# Patient Record
Sex: FEMALE | Race: WHITE | Employment: OTHER | URBAN - METROPOLITAN AREA
[De-identification: names, ages, dates, MRNs, and addresses within clinical notes are randomized per-mention and may not be internally consistent; named-entity substitution may affect disease eponyms.]

---

## 2019-12-16 ENCOUNTER — HOSPITAL ENCOUNTER (OUTPATIENT)
Dept: PREADMISSION TESTING | Age: 69
Discharge: HOME | End: 2019-12-16
Payer: MEDICARE

## 2019-12-16 LAB
ANION GAP SERPL CALC-SCNC: 4 MMOL/L (ref 3–18)
BASOPHILS # BLD: 0 K/UL (ref 0–0.1)
BASOPHILS NFR BLD: 0 % (ref 0–2)
BUN SERPL-MCNC: 32 MG/DL (ref 7–18)
BUN/CREAT SERPL: 30 (ref 12–20)
CALCIUM SERPL-MCNC: 8.6 MG/DL (ref 8.5–10.1)
CHLORIDE SERPL-SCNC: 111 MMOL/L (ref 100–111)
CO2 SERPL-SCNC: 27 MMOL/L (ref 21–32)
CREAT SERPL-MCNC: 1.05 MG/DL (ref 0.6–1.3)
DIFFERENTIAL METHOD BLD: NORMAL
EOSINOPHIL # BLD: 0.2 K/UL (ref 0–0.4)
EOSINOPHIL NFR BLD: 3 % (ref 0–5)
ERYTHROCYTE [DISTWIDTH] IN BLOOD BY AUTOMATED COUNT: 12.7 % (ref 11.6–14.5)
GLUCOSE SERPL-MCNC: 118 MG/DL (ref 74–99)
HCT VFR BLD AUTO: 43.2 % (ref 35–45)
HGB BLD-MCNC: 13.7 G/DL (ref 12–16)
LYMPHOCYTES # BLD: 1.8 K/UL (ref 0.9–3.6)
LYMPHOCYTES NFR BLD: 23 % (ref 21–52)
MCH RBC QN AUTO: 30.6 PG (ref 24–34)
MCHC RBC AUTO-ENTMCNC: 31.7 G/DL (ref 31–37)
MCV RBC AUTO: 96.6 FL (ref 74–97)
MONOCYTES # BLD: 0.6 K/UL (ref 0.05–1.2)
MONOCYTES NFR BLD: 8 % (ref 3–10)
NEUTS SEG # BLD: 5 K/UL (ref 1.8–8)
NEUTS SEG NFR BLD: 66 % (ref 40–73)
PLATELET # BLD AUTO: 254 K/UL (ref 135–420)
PMV BLD AUTO: 10.5 FL (ref 9.2–11.8)
POTASSIUM SERPL-SCNC: 4.8 MMOL/L (ref 3.5–5.5)
RBC # BLD AUTO: 4.47 M/UL (ref 4.2–5.3)
SODIUM SERPL-SCNC: 142 MMOL/L (ref 136–145)
WBC # BLD AUTO: 7.6 K/UL (ref 4.6–13.2)

## 2019-12-16 PROCEDURE — 85025 COMPLETE CBC W/AUTO DIFF WBC: CPT

## 2019-12-16 PROCEDURE — 80048 BASIC METABOLIC PNL TOTAL CA: CPT

## 2019-12-16 PROCEDURE — 36415 COLL VENOUS BLD VENIPUNCTURE: CPT

## 2019-12-30 PROBLEM — N32.89 BLADDER MASS: Status: ACTIVE | Noted: 2019-12-30

## 2019-12-30 RX ORDER — MITOMYCIN 20 MG/40ML
40 SYRINGE (ML) INTRAVESICAL ONCE
Status: COMPLETED | OUTPATIENT
Start: 2019-12-31 | End: 2019-12-31

## 2019-12-31 ENCOUNTER — ANESTHESIA (OUTPATIENT)
Dept: SURGERY | Age: 69
End: 2019-12-31
Payer: MEDICARE

## 2019-12-31 ENCOUNTER — ANESTHESIA EVENT (OUTPATIENT)
Dept: SURGERY | Age: 69
End: 2019-12-31
Payer: MEDICARE

## 2019-12-31 ENCOUNTER — HOSPITAL ENCOUNTER (OUTPATIENT)
Age: 69
Setting detail: OUTPATIENT SURGERY
Discharge: HOME | End: 2019-12-31
Attending: UROLOGY | Admitting: UROLOGY
Payer: MEDICARE

## 2019-12-31 VITALS
WEIGHT: 293 LBS | OXYGEN SATURATION: 96 % | TEMPERATURE: 98.5 F | RESPIRATION RATE: 16 BRPM | SYSTOLIC BLOOD PRESSURE: 141 MMHG | DIASTOLIC BLOOD PRESSURE: 57 MMHG | HEIGHT: 63 IN | BODY MASS INDEX: 51.91 KG/M2 | HEART RATE: 78 BPM

## 2019-12-31 DIAGNOSIS — N32.89 BLADDER MASS: Primary | ICD-10-CM

## 2019-12-31 PROCEDURE — 77030028158 HC ELECTRD BISOLSR PROST RWOL -B: Performed by: UROLOGY

## 2019-12-31 PROCEDURE — 88307 TISSUE EXAM BY PATHOLOGIST: CPT

## 2019-12-31 PROCEDURE — C1769 GUIDE WIRE: HCPCS | Performed by: UROLOGY

## 2019-12-31 PROCEDURE — 74011250636 HC RX REV CODE- 250/636: Performed by: UROLOGY

## 2019-12-31 PROCEDURE — 77030012508 HC MSK AIRWY LMA AMBU -A: Performed by: ANESTHESIOLOGY

## 2019-12-31 PROCEDURE — 76210000026 HC REC RM PH II 1 TO 1.5 HR: Performed by: UROLOGY

## 2019-12-31 PROCEDURE — 76210000006 HC OR PH I REC 0.5 TO 1 HR: Performed by: UROLOGY

## 2019-12-31 PROCEDURE — 74011250636 HC RX REV CODE- 250/636: Performed by: ANESTHESIOLOGY

## 2019-12-31 PROCEDURE — 77030018842 HC SOL IRR SOD CL 9% BAXT -A: Performed by: UROLOGY

## 2019-12-31 PROCEDURE — 77030020782 HC GWN BAIR PAWS FLX 3M -B: Performed by: UROLOGY

## 2019-12-31 PROCEDURE — 77030040361 HC SLV COMPR DVT MDII -B: Performed by: UROLOGY

## 2019-12-31 PROCEDURE — 74011000250 HC RX REV CODE- 250: Performed by: NURSE ANESTHETIST, CERTIFIED REGISTERED

## 2019-12-31 PROCEDURE — 77030034696 HC CATH URETH FOL 2W BARD -A: Performed by: UROLOGY

## 2019-12-31 PROCEDURE — 74011250636 HC RX REV CODE- 250/636: Performed by: NURSE ANESTHETIST, CERTIFIED REGISTERED

## 2019-12-31 PROCEDURE — 76010000149 HC OR TIME 1 TO 1.5 HR: Performed by: UROLOGY

## 2019-12-31 PROCEDURE — 76060000033 HC ANESTHESIA 1 TO 1.5 HR: Performed by: UROLOGY

## 2019-12-31 RX ORDER — LIDOCAINE HYDROCHLORIDE 20 MG/ML
INJECTION, SOLUTION EPIDURAL; INFILTRATION; INTRACAUDAL; PERINEURAL AS NEEDED
Status: DISCONTINUED | OUTPATIENT
Start: 2019-12-31 | End: 2019-12-31 | Stop reason: HOSPADM

## 2019-12-31 RX ORDER — LEVOFLOXACIN 500 MG/1
500 TABLET, FILM COATED ORAL DAILY
Qty: 3 TAB | Refills: 0 | Status: SHIPPED | OUTPATIENT
Start: 2020-01-01 | End: 2020-01-04

## 2019-12-31 RX ORDER — SODIUM CHLORIDE 0.9 % (FLUSH) 0.9 %
5-40 SYRINGE (ML) INJECTION EVERY 8 HOURS
Status: DISCONTINUED | OUTPATIENT
Start: 2019-12-31 | End: 2019-12-31 | Stop reason: HOSPADM

## 2019-12-31 RX ORDER — LEVOFLOXACIN 500 MG/1
500 TABLET, FILM COATED ORAL DAILY
Qty: 3 TAB | Refills: 0 | Status: SHIPPED | OUTPATIENT
Start: 2020-01-01 | End: 2019-12-31 | Stop reason: SDUPTHER

## 2019-12-31 RX ORDER — DEXTROSE MONOHYDRATE 100 MG/ML
125-250 INJECTION, SOLUTION INTRAVENOUS AS NEEDED
Status: DISCONTINUED | OUTPATIENT
Start: 2019-12-31 | End: 2019-12-31 | Stop reason: HOSPADM

## 2019-12-31 RX ORDER — SODIUM CHLORIDE, SODIUM LACTATE, POTASSIUM CHLORIDE, CALCIUM CHLORIDE 600; 310; 30; 20 MG/100ML; MG/100ML; MG/100ML; MG/100ML
1000 INJECTION, SOLUTION INTRAVENOUS CONTINUOUS
Status: DISCONTINUED | OUTPATIENT
Start: 2019-12-31 | End: 2019-12-31 | Stop reason: HOSPADM

## 2019-12-31 RX ORDER — FENTANYL CITRATE 50 UG/ML
INJECTION, SOLUTION INTRAMUSCULAR; INTRAVENOUS AS NEEDED
Status: DISCONTINUED | OUTPATIENT
Start: 2019-12-31 | End: 2019-12-31 | Stop reason: HOSPADM

## 2019-12-31 RX ORDER — LEVOFLOXACIN 5 MG/ML
500 INJECTION, SOLUTION INTRAVENOUS ONCE
Status: COMPLETED | OUTPATIENT
Start: 2019-12-31 | End: 2019-12-31

## 2019-12-31 RX ORDER — SODIUM CHLORIDE 0.9 % (FLUSH) 0.9 %
5-40 SYRINGE (ML) INJECTION AS NEEDED
Status: DISCONTINUED | OUTPATIENT
Start: 2019-12-31 | End: 2019-12-31 | Stop reason: HOSPADM

## 2019-12-31 RX ORDER — TRAMADOL HYDROCHLORIDE 50 MG/1
50 TABLET ORAL
Qty: 25 TAB | Refills: 0 | Status: SHIPPED | OUTPATIENT
Start: 2019-12-31 | End: 2020-01-03

## 2019-12-31 RX ORDER — FLUMAZENIL 0.1 MG/ML
0.2 INJECTION INTRAVENOUS
Status: DISCONTINUED | OUTPATIENT
Start: 2019-12-31 | End: 2019-12-31 | Stop reason: HOSPADM

## 2019-12-31 RX ORDER — MIDAZOLAM HYDROCHLORIDE 1 MG/ML
INJECTION, SOLUTION INTRAMUSCULAR; INTRAVENOUS AS NEEDED
Status: DISCONTINUED | OUTPATIENT
Start: 2019-12-31 | End: 2019-12-31 | Stop reason: HOSPADM

## 2019-12-31 RX ORDER — SODIUM CHLORIDE, SODIUM LACTATE, POTASSIUM CHLORIDE, CALCIUM CHLORIDE 600; 310; 30; 20 MG/100ML; MG/100ML; MG/100ML; MG/100ML
125 INJECTION, SOLUTION INTRAVENOUS CONTINUOUS
Status: DISCONTINUED | OUTPATIENT
Start: 2019-12-31 | End: 2019-12-31 | Stop reason: HOSPADM

## 2019-12-31 RX ORDER — PROPOFOL 10 MG/ML
INJECTION, EMULSION INTRAVENOUS AS NEEDED
Status: DISCONTINUED | OUTPATIENT
Start: 2019-12-31 | End: 2019-12-31 | Stop reason: HOSPADM

## 2019-12-31 RX ORDER — NALOXONE HYDROCHLORIDE 0.4 MG/ML
0.1 INJECTION, SOLUTION INTRAMUSCULAR; INTRAVENOUS; SUBCUTANEOUS AS NEEDED
Status: DISCONTINUED | OUTPATIENT
Start: 2019-12-31 | End: 2019-12-31 | Stop reason: HOSPADM

## 2019-12-31 RX ORDER — ONDANSETRON 2 MG/ML
INJECTION INTRAMUSCULAR; INTRAVENOUS AS NEEDED
Status: DISCONTINUED | OUTPATIENT
Start: 2019-12-31 | End: 2019-12-31 | Stop reason: HOSPADM

## 2019-12-31 RX ORDER — DEXAMETHASONE SODIUM PHOSPHATE 4 MG/ML
INJECTION, SOLUTION INTRA-ARTICULAR; INTRALESIONAL; INTRAMUSCULAR; INTRAVENOUS; SOFT TISSUE AS NEEDED
Status: DISCONTINUED | OUTPATIENT
Start: 2019-12-31 | End: 2019-12-31 | Stop reason: HOSPADM

## 2019-12-31 RX ORDER — HYDROMORPHONE HYDROCHLORIDE 1 MG/ML
0.5 INJECTION, SOLUTION INTRAMUSCULAR; INTRAVENOUS; SUBCUTANEOUS
Status: DISCONTINUED | OUTPATIENT
Start: 2019-12-31 | End: 2019-12-31 | Stop reason: HOSPADM

## 2019-12-31 RX ORDER — FENTANYL CITRATE 50 UG/ML
25 INJECTION, SOLUTION INTRAMUSCULAR; INTRAVENOUS AS NEEDED
Status: DISCONTINUED | OUTPATIENT
Start: 2019-12-31 | End: 2019-12-31 | Stop reason: HOSPADM

## 2019-12-31 RX ORDER — EPHEDRINE SULFATE/0.9% NACL/PF 50 MG/5 ML
SYRINGE (ML) INTRAVENOUS AS NEEDED
Status: DISCONTINUED | OUTPATIENT
Start: 2019-12-31 | End: 2019-12-31 | Stop reason: HOSPADM

## 2019-12-31 RX ORDER — MAGNESIUM SULFATE 100 %
4 CRYSTALS MISCELLANEOUS AS NEEDED
Status: DISCONTINUED | OUTPATIENT
Start: 2019-12-31 | End: 2019-12-31 | Stop reason: HOSPADM

## 2019-12-31 RX ADMIN — FENTANYL CITRATE 25 MCG: 50 INJECTION, SOLUTION INTRAMUSCULAR; INTRAVENOUS at 08:46

## 2019-12-31 RX ADMIN — SODIUM CHLORIDE, SODIUM LACTATE, POTASSIUM CHLORIDE, AND CALCIUM CHLORIDE 125 ML/HR: 600; 310; 30; 20 INJECTION, SOLUTION INTRAVENOUS at 06:37

## 2019-12-31 RX ADMIN — FENTANYL CITRATE 25 MCG: 50 INJECTION, SOLUTION INTRAMUSCULAR; INTRAVENOUS at 09:37

## 2019-12-31 RX ADMIN — Medication 10 MG: at 07:57

## 2019-12-31 RX ADMIN — FENTANYL CITRATE 25 MCG: 50 INJECTION, SOLUTION INTRAMUSCULAR; INTRAVENOUS at 08:40

## 2019-12-31 RX ADMIN — Medication 40 MG: at 08:45

## 2019-12-31 RX ADMIN — SODIUM CHLORIDE, SODIUM LACTATE, POTASSIUM CHLORIDE, AND CALCIUM CHLORIDE 1000 ML: 600; 310; 30; 20 INJECTION, SOLUTION INTRAVENOUS at 10:15

## 2019-12-31 RX ADMIN — FENTANYL CITRATE 25 MCG: 50 INJECTION, SOLUTION INTRAMUSCULAR; INTRAVENOUS at 09:17

## 2019-12-31 RX ADMIN — FENTANYL CITRATE 25 MCG: 50 INJECTION, SOLUTION INTRAMUSCULAR; INTRAVENOUS at 07:50

## 2019-12-31 RX ADMIN — DEXAMETHASONE SODIUM PHOSPHATE 4 MG: 4 INJECTION, SOLUTION INTRAMUSCULAR; INTRAVENOUS at 07:45

## 2019-12-31 RX ADMIN — FENTANYL CITRATE 25 MCG: 50 INJECTION, SOLUTION INTRAMUSCULAR; INTRAVENOUS at 09:27

## 2019-12-31 RX ADMIN — PROPOFOL 170 MG: 10 INJECTION, EMULSION INTRAVENOUS at 07:37

## 2019-12-31 RX ADMIN — FENTANYL CITRATE 25 MCG: 50 INJECTION, SOLUTION INTRAMUSCULAR; INTRAVENOUS at 07:37

## 2019-12-31 RX ADMIN — ONDANSETRON HYDROCHLORIDE 4 MG: 2 INJECTION INTRAMUSCULAR; INTRAVENOUS at 07:45

## 2019-12-31 RX ADMIN — MIDAZOLAM 2 MG: 1 INJECTION INTRAMUSCULAR; INTRAVENOUS at 07:29

## 2019-12-31 RX ADMIN — LIDOCAINE HYDROCHLORIDE 60 MG: 20 INJECTION, SOLUTION EPIDURAL; INFILTRATION; INTRACAUDAL; PERINEURAL at 07:37

## 2019-12-31 RX ADMIN — Medication 10 MG: at 08:29

## 2019-12-31 RX ADMIN — LEVOFLOXACIN 500 MG: 5 INJECTION, SOLUTION INTRAVENOUS at 07:29

## 2019-12-31 RX ADMIN — SODIUM CHLORIDE, SODIUM LACTATE, POTASSIUM CHLORIDE, AND CALCIUM CHLORIDE: 600; 310; 30; 20 INJECTION, SOLUTION INTRAVENOUS at 08:20

## 2019-12-31 NOTE — ANESTHESIA POSTPROCEDURE EVALUATION
Procedure(s):  CYSTOSCOPY, TRANSURETHRAL RESECTION OF BLADDER TUMOR-  LARGE WITH MITOMYCIN. No value filed. Anesthesia Post Evaluation        Comments: Post-Anesthesia Evaluation and Assessment    Cardiovascular Function/Vital Signs  /49   Pulse 80   Temp 36.9 °C (98.5 °F)   Resp 19   Ht 5' 3\" (1.6 m)   Wt 134.9 kg (297 lb 5 oz)   SpO2 97%   BMI 52.67 kg/m²     Patient is status post Procedure(s):  CYSTOSCOPY, TRANSURETHRAL RESECTION OF BLADDER TUMOR-  LARGE WITH MITOMYCIN. Nausea/Vomiting: Controlled. Postoperative hydration reviewed and adequate. Pain:  Pain Scale 1: Numeric (0 - 10) (12/31/19 0927)  Pain Intensity 1: 5 (12/31/19 0927)   Managed. Neurological Status:   Neuro (WDL): Exceptions to WDL (12/31/19 0852)   At baseline. Mental Status and Level of Consciousness: Arousable. Pulmonary Status:   O2 Device: Room air (12/31/19 0929)   Adequate oxygenation and airway patent. Complications related to anesthesia: None    Post-anesthesia assessment completed. No concerns. Patient has met all discharge requirements. Signed By: Deidra Peng MD    December 31, 2019                   Vitals Value Taken Time   /49 12/31/2019  9:30 AM   Temp 36.9 °C (98.5 °F) 12/31/2019  8:56 AM   Pulse 75 12/31/2019  9:34 AM   Resp 17 12/31/2019  9:34 AM   SpO2 96 % 12/31/2019  9:34 AM   Vitals shown include unvalidated device data.

## 2019-12-31 NOTE — PERIOP NOTES
Reviewed discharge instructions with patient and her , including home chemo precautions, they verbalized understanding. Clarified with Dr Miguel Wilcox to stop taking Plavix till this Friday when he will give further instructions regarding.  ID band removed and placed in shredder at time of discharge

## 2019-12-31 NOTE — H&P
Urology [address] [place] [address]  Tel: [phone]  Fax: [phone]      Patient: Patrick Hanna  YOB: 1950          This 81year old  patient was referred by Rupa Buck. This 81year old female presents for Urinary Retention, Kidney and/or Ureteral Stone and Hematuria. History of Present Illness:  1. Urinary Retention   Severity level is severe. It occurs constantly. The problem is improving. Associated symptoms include hematuria (gross), incomplete emptying, pressure and slow stream. Pertinent negatives include chills, constipation and fever. Additional information: She went into retention after not really having any problems previously. She is did okay with the bain. her  catheter is out and she is voiding but not as good as baseline. MHL=650nr (12/6/19)         AAR=245 (12/13/19)    She is elie to void though. 2.  Kidney and/or Ureteral Stone   Onset was 2 weeks ago. Severity level is no pain. It occurs constantly. The problem is with no change. Location of pain is right flank. The pain does not radiate. Associated symptoms include hematuria (gross). Pertinent negatives include chills, constipation, diarrhea, fever, nausea and vomiting. 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     12/13/19 - No flank pain or radiation or aggravating issues lately. .      3.  Hematuria   The patient presents with hematuria (gross). The problem began 2 weeks ago. The symptoms are intermittent. The problem has improved. She denies pain. Pertinent history includes being at least 38years of age but not history of UTIs and family history of stones or prior prostate surgeries. She also complains of incomplete emptying, pressure and slow stream. She denies chills, fever, nausea and vomiting.  Additional information: She had gross hematuria just prior to going into retention. Cytology = pending     CT = stone. PAST MEDICAL/SURGICAL HISTORY   (Reviewed, updated)    Disease/disorder Onset Date Management Date Comments     Right foot plantar fascitis surgery     Total Hysterectomy         Hysterectomy     Appendectomy       Atrial Flutter              Cancer, uterine       Dyspnea       Endoscopy         Appendectomy 2010    Hypertension       Obesity       Stroke             PROBLEM LIST:   Problem List reviewed. Problem Description Onset Date Chronic Clinical Status Notes   Hypertension  Y     TIA  Y     Hyperlipidemia  Y           Medications (active prior to today)  Medication Name Sig Description Start Date Stop Date Refilled Rx Elsewhere   lisinopril 20 mg tablet take 1 tablet by oral route 2 times every day //   Y   Plavix 75 mg tablet take 1 tablet by oral route  every day //   Y   simvastatin 40 mg tablet take 1 tablet by oral route  every day in the evening //   Y     Medication Reconciliation  Medications reconciled today. Allergies  Ingredient Reaction (Severity) Medication Name Comment   PENICILLINS        Reviewed, no changes. Family History  (Reviewed, updated)  Relationship Family Member Name  Age at Death Condition Onset Age Cause of Death       No family history of Emphysema  N   Family h/o    Cancer - uterine     Father  Y  Coronary artery disease  N   Maternal grandfather    Cancer - lung           Social History:  (Reviewed, updated)  Tobacco use reviewed. Preferred language is [place]. MARITAL STATUS/FAMILY/SOCIAL SUPPORT  Marital status:    Tobacco use status: Current non-smoker. Smoking status: Never smoker. TOBACCO SCREENING:  Patient has never used tobacco. Patient has not used tobacco in the last 30 days. Patient has not used smokeless tobacco in the last 30 days.     SMOKING STATUS  Type Smoking Status Usage Per Day Years Used Pack Years Total Pack Years    Never smoker         ALCOHOL  There is a history of alcohol use. consumed rarely. Review of Systems  System Neg/Pos Details   Constitutional Negative Chills, Fever and Pain. GI Negative Constipation, Diarrhea, Nausea and Vomiting.  Positive Hematuria, Incomplete emptying, Pressure, Slow stream.     Vital Signs   Height  Time ft in cm Last Measured Height Position   12:36 PM 5.0 3.00 160.02 12/13/2019 Standing   Weight/BSA/BMI  Time lb oz kg Context BMI kg/m2 BSA m2   12:36 .00  128.820 dressed with shoes 50.31    Measured By  Time Measured by   12:36 PM Ca Glover     Physical Exam  Exam Findings Details   Constitutional Normal Well developed. Neck Exam Normal Inspection - Normal.   Respiratory Normal Inspection - Normal.   Abdomen Normal No abdominal tenderness. Genitourinary Normal Urethral meatus - Normal. External genitalia - Normal. Glands - Normal. Perineum - Normal. Anus - Normal. Vagina - Normal. No Suprapubic tenderness. No CVA tenderness. Extremity Normal No Edema. Psychiatric Normal Orientation - Oriented to time, place, person & situation. Appropriate mood and affect. Procedures:      Cystoscopy indication:  Other. Patient consent:  Consent was obtained. The procedure and risks were explained in detail. The patient was prepped and draped in the usual sterile fashion. Procedure:  A diagnostic cystourethroscopy was performed using a 16 Bulgarian flexible cystoscope  Patient position:  Dorsal lithotomy. Patient response:  Patient tolerated procedure well. Patient was given instructions. Patient was discharged in stable condition. Findings:  Anterior urethra normal in appearance. The bladder has lesion/mass found on the bladder. The first lesion/mass is 3cm is located dome of the bladder with characteristics of papillary. The second lesion/mass is 5cm is located left side of the bladder with characteristics of friable.  Ureteral orifices normal in appearance. Antibiotics:  Antibiotics given:  Impression:  Gross hematuria R31.0. Uroflow:  Feeling of incomplete bladder emptying R39.14. Procedure:   Sonographic residual urine: 137mL. Time after void: 5 Minutes. Comments:. Physician: Little Bartholomew MD. Date: 12/13/2019. Time: 12:37 PM.  Post procedure: Instructions:. Assessment/Plan  # Detail Type Description    1. 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 Patient Plan She has a large greater than 5cm bladder mass that is hanging from the bladder neck and very obstructive at the urethra. This has the appearance of a malignancy based on initial inspection. She also has a area approx 4cm on the left lateral wall that is raised erythema that could be inflammation but could be malignancy as well. We will do a cysto, TURBT large and intravesical installation of Mitomycin. Risk of bleeding, infection, injury, pain and possible need for future procedures were discussed. She will proceed. 2. Assessment Other retention of urine (R33.8). Patient Plan She went into urinary retention due to the obstruction from this mass. We discussed that as long as the mass is there, there is still the possibility of going into retention but I expect her to void much better following TURBT. 3. Assessment Feeling of incomplete bladder emptying (R39.14). Patient Plan Her PVR is acceptable today at 137ml. She is voiding. Plan Orders Basic Metabolic Panel (8) to be performed. and CBC With Differential/Platelet to be performed. 4. Assessment Kidney stone (N20.0). Patient Plan She has a staghorn right sided stone and eventually will need a right PCNL once her  bladder mass issues have been fully dealt with. Plan Orders The patient had the following order(s) completed today: Abdominal/KUB 1 View. Interpretation: See module. 5. Assessment Gross hematuria (R31.0).     Patient Plan The hematuria could be from the bladder mass or from the staghorn stone. Plan Orders Urine Culture, Routine to be performed. Obtained on 12/13/2019.         6. Assessment Acute cystitis with hematuria (N30.01). Patient Plan Her urine is sent for culture today. Patient Education  # Patient Education   1.  Cystoscopy: Care Instructions     Medications (added, continued, or stopped today)  Start Date Medication Directions PRN Status PRN Reason Instruction Stop Date    lisinopril 20 mg tablet take 1 tablet by oral route 2 times every day N       Plavix 75 mg tablet take 1 tablet by oral route  every day N       simvastatin 40 mg tablet take 1 tablet by oral route  every day in the evening N      Active Patient Care Team Members    Name Contact Agency Type Support Role Relationship Active Date Inactive Date Specialty   Araseli Sanchez   Patient provider PCP      Sean Alonso   Emergency Contact Spouse      Natalie Caicedo   encounter provider    Urology   Genny Mancini   encounter provider    Pulmonary Medicine       Provider:      Joyce Cain MD

## 2019-12-31 NOTE — PROGRESS NOTES
conducted a pre-surgery visit with Brennen Amaya, who is a 81 y.o.,female. The  provided the following Interventions:  Initiated a relationship of care and support. Offered prayer and assurance of continued prayers on patient's behalf. Plan:  Chaplains will continue to follow and will provide pastoral care on an as needed/requested basis.  recommends bedside caregivers page  on duty if patient shows signs of acute spiritual or emotional distress.       [address] [facility]   [phone]

## 2019-12-31 NOTE — ANESTHESIA PREPROCEDURE EVALUATION
Relevant Problems   No relevant active problems       Anesthetic History   No history of anesthetic complications            Review of Systems / Medical History  Patient summary reviewed, nursing notes reviewed and pertinent labs reviewed    Pulmonary  Within defined limits                 Neuro/Psych       CVA  TIA     Cardiovascular    Hypertension              Exercise tolerance: >4 METS     GI/Hepatic/Renal  Within defined limits              Endo/Other        Morbid obesity     Other Findings              Physical Exam    Airway  Mallampati: II  TM Distance: 4 - 6 cm  Neck ROM: normal range of motion   Mouth opening: Normal     Cardiovascular    Rhythm: regular  Rate: normal         Dental  No notable dental hx       Pulmonary  Breath sounds clear to auscultation               Abdominal  GI exam deferred       Other Findings            Anesthetic Plan    ASA: 3  Anesthesia type: general          Induction: Intravenous  Anesthetic plan and risks discussed with: Patient

## 2019-12-31 NOTE — PERIOP NOTES
Intake/Output Summary (Last 24 hours) at 12/31/2019 0946  Last data filed at 12/31/2019 0923  Gross per 24 hour   Intake 1200 ml   Output    Net 1200 ml

## 2019-12-31 NOTE — INTERVAL H&P NOTE
H&P Update:  Chelo Mendieta was seen and examined. History and physical has been reviewed. The patient has been examined.  There have been no significant clinical changes since the completion of the originally dated History and Physical.

## 2019-12-31 NOTE — PERIOP NOTES
TRANSFER - IN REPORT:    Verbal report received from ORN & CRNA on Nya Lob  being received from OR (unit) for routine post - op      Report consisted of patients Situation, Background, Assessment and   Recommendations(SBAR). Information from the following report(s) SBAR was reviewed with the receiving nurse. Opportunity for questions and clarification was provided. Assessment completed upon patients arrival to unit and care assumed.

## 2019-12-31 NOTE — PERIOP NOTES
The bain catheter was released to drain (mitomycin) as ordered by surgeon. I called report to JAYE Diamond<RN patient is being transferred to phase 2. After cathter finished draining it needs to be removed and patient has to void before going home. Phase 2 nurse notified patient on Chemo precautions.

## 2020-01-01 NOTE — OP NOTES
[facility]  OPERATIVE REPORT    Name:  Orestes Lindsey  MR#:   [id number]  :  1950  ACCOUNT #:  [de-identified]  DATE OF SERVICE:  2019    PREOPERATIVE DIAGNOSIS:  Neoplasm of the bladder, unspecified. POSTOPERATIVE DIAGNOSIS:  Neoplasm of the bladder, unspecified. PROCEDURES PERFORMED:  Cystoscopy, transurethral resection of bladder tumor, large, with intravesical instillation of mitomycin. SURGEON:  Rafia Narayanan MD    ASSISTANT:  None. ANESTHESIA:  General.    COMPLICATIONS:  none    SPECIMENS REMOVED:  1.  Bladder tumor. 2.  Bladder tumor deeper bites. IMPLANTS:  20Fr bain    ESTIMATED BLOOD LOSS:  Minimal.    FINDINGS:  The patient had a large greater than 7-cm mass at the bladder neck that was very obstructive of the urethra. The mass was emanating from the right side of the bladder neck laterally between the 8 and 10 o'clock positions. The ureters were intact and unharmed. CLINICAL NOTE:  The patient is a 61-year-old female with gross hematuria and urinary retention who was found to have a large bladder tumor. She presents for TURBT. PROCEDURE:  Preoperatively, risks and benefits of the surgery were described to the patient. The risks included, but not limited to bleeding, infection, injury to the bladder, and possible need for future procedures. The patient understood the risks and signed the informed consent. The patient was taken to the operating room and placed on the OR table in supine position. She was administered general anesthetic. She was administered intravenous antibiotics. She was placed in dorsal lithotomy position and prepped and draped in the usual sterile manner. A 25-Frisian resectoscope and sheath were then inserted transurethrally atraumatically under direct vision using a 30-degree lens and visual obturator. The urethra was normal.  Once I got to the bladder neck, there was a huge obstructing papillary tumor greater than 7 cm. This was falling in front of the bladder neck. Once I moved past it into the bladder, the remainder of the bladder appeared normal.  Bilateral ureteral orifices were in normal anatomic position. There were no stones, no other tumors were identified. Just this large 7-cm mass coming from the bladder neck and growing across the bladder neck. Next, using a bipolar loop resectoscope, I shaved the tumor down into many small fragments as allowed to almost even with the bladder wall. Care was taken not to injure the ureters. Using an [facility], I then got the tumor pieces out of the bladder without difficulty. These were sent off as bladder tumor specimen. Then, I resected the base of the bladder mass going down into the muscle. These pieces were then evacuated from the bladder and sent off as bladder tumor deep, and by gross appearance, there were definitely muscle fibers seen. There was no perforation of the bladder seen. All bleeding was controlled with cautery. The ureteral orifices were intact and unharmed. At this point, when all the bladder pieces had been evacuated and the bleeding had been inspected, I removed the scope and placed a 20-Occitan Quintanilla catheter. The bladder was filled with 40 mg of mitomycin, and the catheter was clamped. The patient was then taken out of lithotomy position, revived from anesthesia, and taken to recovery in stable condition.       MD ARNOLDO Burr/[email or web address]  D:  12/31/2019 11:33  T:  12/31/2019 21:59  JOB #:  [id number]

## 2020-02-07 ENCOUNTER — HOSPITAL ENCOUNTER (OUTPATIENT)
Dept: PREADMISSION TESTING | Age: 70
Discharge: HOME | End: 2020-02-07
Payer: MEDICARE

## 2020-02-07 DIAGNOSIS — E72.12 HOMOZYGOUS FOR C677T POLYMORPHISM OF 5,10-METHYLENETETRAHYDROFOLATE REDUCTASE (HCC): ICD-10-CM

## 2020-02-07 LAB
ANION GAP SERPL CALC-SCNC: 3 MMOL/L (ref 3–18)
BUN SERPL-MCNC: 20 MG/DL (ref 7–18)
BUN/CREAT SERPL: 19 (ref 12–20)
CALCIUM SERPL-MCNC: 8.7 MG/DL (ref 8.5–10.1)
CHLORIDE SERPL-SCNC: 108 MMOL/L (ref 100–111)
CO2 SERPL-SCNC: 30 MMOL/L (ref 21–32)
CREAT SERPL-MCNC: 1.08 MG/DL (ref 0.6–1.3)
GLUCOSE SERPL-MCNC: 101 MG/DL (ref 74–99)
HCT VFR BLD AUTO: 44.7 % (ref 35–45)
HGB BLD-MCNC: 14.2 G/DL (ref 12–16)
POTASSIUM SERPL-SCNC: 4.8 MMOL/L (ref 3.5–5.5)
SODIUM SERPL-SCNC: 141 MMOL/L (ref 136–145)

## 2020-02-07 PROCEDURE — 80048 BASIC METABOLIC PNL TOTAL CA: CPT

## 2020-02-07 PROCEDURE — 36415 COLL VENOUS BLD VENIPUNCTURE: CPT

## 2020-02-07 PROCEDURE — 93005 ELECTROCARDIOGRAM TRACING: CPT

## 2020-02-07 PROCEDURE — 85018 HEMOGLOBIN: CPT

## 2020-02-08 LAB
ATRIAL RATE: 74 BPM
CALCULATED P AXIS, ECG09: 70 DEGREES
CALCULATED R AXIS, ECG10: 16 DEGREES
CALCULATED T AXIS, ECG11: 73 DEGREES
DIAGNOSIS, 93000: NORMAL
P-R INTERVAL, ECG05: 144 MS
Q-T INTERVAL, ECG07: 404 MS
QRS DURATION, ECG06: 94 MS
QTC CALCULATION (BEZET), ECG08: 448 MS
VENTRICULAR RATE, ECG03: 74 BPM

## 2020-02-09 PROBLEM — C67.5 CANCER OF BLADDER NECK (HCC): Status: ACTIVE | Noted: 2020-02-09

## 2020-02-10 ENCOUNTER — ANESTHESIA EVENT (OUTPATIENT)
Dept: SURGERY | Age: 70
End: 2020-02-10
Payer: MEDICARE

## 2020-02-10 RX ORDER — MITOMYCIN 20 MG/40ML
40 SYRINGE (ML) INTRAVESICAL ONCE
Status: COMPLETED | OUTPATIENT
Start: 2020-02-11 | End: 2020-02-11

## 2020-02-10 NOTE — H&P
Urology [address] [place] [address]  Tel: [phone]  Fax: [phone]      Patient: Capri Jung  YOB: 1950          This 81year old female presents for Bladder CA, Urinary Retention, Kidney and/or Ureteral Stone and Hematuria. History of Present Illness:  1. Bladder CA   The patient is here today for scheduled follow up. The patient's status is improved. She was diagnosed on 12/31/2019. Pertinent history includes being over 38years old,  race, radiation exposure and Patient has never smoked. The patient denies chest pain or nausea. Additional information: 1/3/2020 -- She is doing well s/p TURBT and mitomycin. no gross hematuria.  some slowed stream at times. 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 2.  Urinary Retention   Onset was 2 months ago. Severity level is severe. It occurs constantly. The problem is improving. Associated symptoms include hematuria (gross), incomplete emptying, pressure and slow stream. Pertinent negatives include chills, constipation and fever. Additional information: She went into retention after not really having any problems previously. She is did okay with the bain. her  catheter is out and she is voiding but not as good as baseline. VXI=[id number] (12/6/19)         VXF=290 (12/13/19)       1/3/2019 - s/p TURBT, she is voidn much better bust stillh as some frequency and urgency. 3.  Kidney and/or Ureteral Stone   Onset was 2 months ago. Severity level is no pain. It occurs constantly. The problem is with no change. Location of pain is right flank. The pain does not radiate. Associated symptoms include hematuria (gross). Pertinent negatives include chills, constipation, diarrhea, fever, nausea, pain and vomiting.  Additional information: CT (11/27/19 at [facility]) -- Right lower pole staghorn going into renal pelvis in a moderqtely smaller but still functional right kidney and a right upper pole stone too. .      4.  Hematuria   The patient presents with hematuria (gross). The problem began 2 months ago. The symptoms are intermittent. The problem has improved. She denies pain. Pertinent history includes being at least 38years of age but not history of UTIs and family history of stones or prior prostate surgeries. She also complains of incomplete emptying, pressure and slow stream. She denies chills, fever, nausea and vomiting. Additional information: She had gross hematuria just prior to going into retention. Cytology = POSITIVE (2019)     CT = stone. PAST MEDICAL/SURGICAL HISTORY   (Reviewed, updated)    Disease/disorder Onset Date Management Date Comments     Cysto, TURBT (large), intraavesical instillation of mitomycin 2019      Right foot plantar fascitis surgery       Hysterectomy     Cancer, uterine       Atrial Flutter       Dyspnea       Appendectomy       Total Hysterectomy              Endoscopy         Appendectomy     Stroke       Obesity       Hypertension             PROBLEM LIST:   Problem List reviewed. Problem Description Onset Date Chronic Clinical Status Notes   Hypertension  Y     TIA  Y     Hyperlipidemia  Y           Medications (active prior to today)  Medication Name Sig Description Start Date Stop Date Refilled Rx Elsewhere   lisinopril 20 mg tablet take 1 tablet by oral route 2 times every day //   Y   Plavix 75 mg tablet take 1 tablet by oral route  every day //   Y   simvastatin 40 mg tablet take 1 tablet by oral route  every day in the evening //   Y   mitomycin 40 mg/10 mL (4 mg/mL) intravesical solution in 40cc of sterile water 2019  N     Medication Reconciliation  Medications reconciled today. Allergies  Ingredient Reaction (Severity) Medication Name Comment   PENICILLINS        Reviewed, no changes.   Family History  (Reviewed, updated)  Relationship Family Member Name  Age at Death Condition Onset Age Cause of Death       No family history of Emphysema  N   Family h/o    Cancer - uterine     Father  Y  Coronary artery disease  N   Maternal grandfather    Cancer - lung           Social History:  (Reviewed, updated)  Tobacco use reviewed. Preferred language is [place]. MARITAL STATUS/FAMILY/SOCIAL SUPPORT  Marital status:    Tobacco use status: Current non-smoker. Smoking status: Never smoker. TOBACCO SCREENING:  Patient has never used tobacco. Patient has not used tobacco in the last 30 days. Patient has not used smokeless tobacco in the last 30 days. SMOKING STATUS  Type Smoking Status Usage Per Day Years Used Pack Years Total Pack Years    Never smoker         ALCOHOL  There is a history of alcohol use. consumed rarely. Review of Systems  System Neg/Pos Details   Constitutional Negative Chills, Fever and Pain. ENMT Negative Ear infections and Sore throat. Eyes Negative Blurred vision, Double vision and Eye pain. Respiratory Negative Asthma, Chronic cough, Dyspnea and Wheezing. Cardio Negative Chest pain. GI Negative Constipation, Decreased appetite, Diarrhea, Nausea and Vomiting.  Positive Hematuria, Incomplete emptying, Pressure, Slow stream.   Endocrine Negative Cold intolerance, Heat intolerance, Increased thirst and Weight loss. Neuro Negative Headache and Tremors. Psych Negative Anxiety and Depression. Integumentary Negative Itching skin and Rash. MS Negative Back pain and Joint pain. Hema/Lymph Negative Easy bleeding. Vital Signs   Height  Time ft in cm Last Measured Height Position   8:06 AM 5.0 3.00 160.02 2019 Standing   Weight/BSA/BMI  Time lb oz kg Context BMI kg/m2 BSA m2   8:06 .00  129.274  50.49    Measured By  Time Measured by   8:06 AM Tatiana Pulido     Physical Exam  Exam Findings Details   Constitutional Normal Well developed.    Neck Exam Normal Inspection - Normal.   Respiratory Normal Inspection - Normal.   Abdomen Normal No abdominal tenderness. Genitourinary Normal No Suprapubic tenderness. No CVA tenderness. Extremity Normal No Edema. Psychiatric Normal Orientation - Oriented to time, place, person & situation. Appropriate mood and affect. Assessment/Plan  # Detail Type Description    1. Assessment Cancer of bladder neck (C67.5). Patient Plan She had a large tumor at the bladder neck that was high grade T1 disease. We discussed her pathology in great detail. We discussed there is a high likelihood at nearly an 80% chance of recurrence. We discussed there is a 50% chance of progression to muscle invasive disease. We discussed the need for repeat resection and the fact that on 20% pt's we will find a residual cancer on a repeat resection 4 to 8wks later. We discussed the risk and benefits of Mitomycin therapy at the time of resection. Risk of bleeding, infection, bladder irritation, necrosis of surrounding bladder tissue. We discussed the need for BCG after her repeat resection a few wks later. We went over everything in great detail and I answered all questions. She will proceed with a cysto, repeat resection of the bladder with intravesical installation of Mitomycin. We will also further stage her with a 3 phase CT. We discussed that 4% of pt's with bladder cancer will have upper tract disease. Her prior CT scan was not done in a way that would assess for this. 2. Assessment Kidney stone (N20.0). Patient Plan She has a large staghorn stone that will need to be addressed at some point following her cancer tx's.                      Medications (added, continued, or stopped today)  Start Date Medication Directions PRN Status PRN Reason Instruction Stop Date    lisinopril 20 mg tablet take 1 tablet by oral route 2 times every day N      12/30/2019 mitomycin 40 mg/10 mL (4 mg/mL) intravesical solution in 40cc of sterile water N 01/03/2020    Plavix 75 mg tablet take 1 tablet by oral route  every day N       simvastatin 40 mg tablet take 1 tablet by oral route  every day in the evening N      Active Patient Care Team Members    Name Contact Agency Type Support Role Relationship Active Date Inactive Date Specialty   Rickie Robb   Patient provider PCP      Isela Postal   Emergency Contact Spouse      Mayra Croonel   encounter provider    Urology   Nina Cardoso   encounter provider    Pulmonary Medicine       Provider:       Ifrah Walters MD

## 2020-02-11 ENCOUNTER — ANESTHESIA (OUTPATIENT)
Dept: SURGERY | Age: 70
End: 2020-02-11
Payer: MEDICARE

## 2020-02-11 ENCOUNTER — HOSPITAL ENCOUNTER (OUTPATIENT)
Age: 70
Setting detail: OUTPATIENT SURGERY
Discharge: HOME | End: 2020-02-11
Attending: UROLOGY | Admitting: UROLOGY
Payer: MEDICARE

## 2020-02-11 VITALS
HEART RATE: 85 BPM | DIASTOLIC BLOOD PRESSURE: 72 MMHG | HEIGHT: 63 IN | OXYGEN SATURATION: 99 % | WEIGHT: 293 LBS | TEMPERATURE: 97.9 F | BODY MASS INDEX: 51.91 KG/M2 | RESPIRATION RATE: 16 BRPM | SYSTOLIC BLOOD PRESSURE: 146 MMHG

## 2020-02-11 DIAGNOSIS — C67.5 CANCER OF BLADDER NECK (HCC): Primary | ICD-10-CM

## 2020-02-11 PROCEDURE — 76010000138 HC OR TIME 0.5 TO 1 HR: Performed by: UROLOGY

## 2020-02-11 PROCEDURE — 77030028158 HC ELECTRD BISOLSR PROST RWOL -B: Performed by: UROLOGY

## 2020-02-11 PROCEDURE — 77030034696 HC CATH URETH FOL 2W BARD -A: Performed by: UROLOGY

## 2020-02-11 PROCEDURE — 74011000250 HC RX REV CODE- 250: Performed by: NURSE ANESTHETIST, CERTIFIED REGISTERED

## 2020-02-11 PROCEDURE — 88307 TISSUE EXAM BY PATHOLOGIST: CPT

## 2020-02-11 PROCEDURE — 74011250636 HC RX REV CODE- 250/636: Performed by: UROLOGY

## 2020-02-11 PROCEDURE — 74011250636 HC RX REV CODE- 250/636: Performed by: NURSE ANESTHETIST, CERTIFIED REGISTERED

## 2020-02-11 PROCEDURE — 74011250636 HC RX REV CODE- 250/636: Performed by: ANESTHESIOLOGY

## 2020-02-11 PROCEDURE — 77030040361 HC SLV COMPR DVT MDII -B: Performed by: UROLOGY

## 2020-02-11 PROCEDURE — 76060000032 HC ANESTHESIA 0.5 TO 1 HR: Performed by: UROLOGY

## 2020-02-11 PROCEDURE — 76210000022 HC REC RM PH II 1.5 TO 2 HR: Performed by: UROLOGY

## 2020-02-11 PROCEDURE — 77030012508 HC MSK AIRWY LMA AMBU -A: Performed by: ANESTHESIOLOGY

## 2020-02-11 PROCEDURE — 76210000006 HC OR PH I REC 0.5 TO 1 HR: Performed by: UROLOGY

## 2020-02-11 PROCEDURE — 77030020782 HC GWN BAIR PAWS FLX 3M -B: Performed by: UROLOGY

## 2020-02-11 PROCEDURE — 77030018832 HC SOL IRR H20 ICUM -A: Performed by: UROLOGY

## 2020-02-11 RX ORDER — FLUMAZENIL 0.1 MG/ML
0.2 INJECTION INTRAVENOUS
Status: DISCONTINUED | OUTPATIENT
Start: 2020-02-11 | End: 2020-02-11 | Stop reason: HOSPADM

## 2020-02-11 RX ORDER — PROPOFOL 10 MG/ML
INJECTION, EMULSION INTRAVENOUS AS NEEDED
Status: DISCONTINUED | OUTPATIENT
Start: 2020-02-11 | End: 2020-02-11 | Stop reason: HOSPADM

## 2020-02-11 RX ORDER — SODIUM CHLORIDE, SODIUM LACTATE, POTASSIUM CHLORIDE, CALCIUM CHLORIDE 600; 310; 30; 20 MG/100ML; MG/100ML; MG/100ML; MG/100ML
1000 INJECTION, SOLUTION INTRAVENOUS CONTINUOUS
Status: DISCONTINUED | OUTPATIENT
Start: 2020-02-11 | End: 2020-02-11 | Stop reason: HOSPADM

## 2020-02-11 RX ORDER — CIPROFLOXACIN 500 MG/1
500 TABLET ORAL 2 TIMES DAILY
Qty: 6 TAB | Refills: 0 | Status: SHIPPED | OUTPATIENT
Start: 2020-02-11 | End: 2020-02-14

## 2020-02-11 RX ORDER — SODIUM CHLORIDE 0.9 % (FLUSH) 0.9 %
5-40 SYRINGE (ML) INJECTION AS NEEDED
Status: DISCONTINUED | OUTPATIENT
Start: 2020-02-11 | End: 2020-02-11 | Stop reason: HOSPADM

## 2020-02-11 RX ORDER — MIDAZOLAM HYDROCHLORIDE 1 MG/ML
INJECTION, SOLUTION INTRAMUSCULAR; INTRAVENOUS AS NEEDED
Status: DISCONTINUED | OUTPATIENT
Start: 2020-02-11 | End: 2020-02-11 | Stop reason: HOSPADM

## 2020-02-11 RX ORDER — GLYCOPYRROLATE 0.2 MG/ML
INJECTION INTRAMUSCULAR; INTRAVENOUS AS NEEDED
Status: DISCONTINUED | OUTPATIENT
Start: 2020-02-11 | End: 2020-02-11 | Stop reason: HOSPADM

## 2020-02-11 RX ORDER — SODIUM CHLORIDE 0.9 % (FLUSH) 0.9 %
5-40 SYRINGE (ML) INJECTION EVERY 8 HOURS
Status: DISCONTINUED | OUTPATIENT
Start: 2020-02-11 | End: 2020-02-11 | Stop reason: HOSPADM

## 2020-02-11 RX ORDER — FENTANYL CITRATE 50 UG/ML
25 INJECTION, SOLUTION INTRAMUSCULAR; INTRAVENOUS AS NEEDED
Status: DISCONTINUED | OUTPATIENT
Start: 2020-02-11 | End: 2020-02-11 | Stop reason: HOSPADM

## 2020-02-11 RX ORDER — DEXAMETHASONE SODIUM PHOSPHATE 4 MG/ML
INJECTION, SOLUTION INTRA-ARTICULAR; INTRALESIONAL; INTRAMUSCULAR; INTRAVENOUS; SOFT TISSUE AS NEEDED
Status: DISCONTINUED | OUTPATIENT
Start: 2020-02-11 | End: 2020-02-11 | Stop reason: HOSPADM

## 2020-02-11 RX ORDER — SODIUM CHLORIDE, SODIUM LACTATE, POTASSIUM CHLORIDE, CALCIUM CHLORIDE 600; 310; 30; 20 MG/100ML; MG/100ML; MG/100ML; MG/100ML
125 INJECTION, SOLUTION INTRAVENOUS CONTINUOUS
Status: DISCONTINUED | OUTPATIENT
Start: 2020-02-11 | End: 2020-02-11 | Stop reason: HOSPADM

## 2020-02-11 RX ORDER — NALOXONE HYDROCHLORIDE 0.4 MG/ML
0.1 INJECTION, SOLUTION INTRAMUSCULAR; INTRAVENOUS; SUBCUTANEOUS AS NEEDED
Status: DISCONTINUED | OUTPATIENT
Start: 2020-02-11 | End: 2020-02-11 | Stop reason: HOSPADM

## 2020-02-11 RX ORDER — HYDROMORPHONE HYDROCHLORIDE 2 MG/ML
0.5 INJECTION, SOLUTION INTRAMUSCULAR; INTRAVENOUS; SUBCUTANEOUS
Status: DISCONTINUED | OUTPATIENT
Start: 2020-02-11 | End: 2020-02-11 | Stop reason: HOSPADM

## 2020-02-11 RX ORDER — EPHEDRINE SULFATE/0.9% NACL/PF 50 MG/5 ML
SYRINGE (ML) INTRAVENOUS AS NEEDED
Status: DISCONTINUED | OUTPATIENT
Start: 2020-02-11 | End: 2020-02-11 | Stop reason: HOSPADM

## 2020-02-11 RX ORDER — TRAMADOL HYDROCHLORIDE 50 MG/1
50 TABLET ORAL
Qty: 10 TAB | Refills: 0 | Status: SHIPPED | OUTPATIENT
Start: 2020-02-11 | End: 2020-02-14

## 2020-02-11 RX ORDER — HYDROMORPHONE HYDROCHLORIDE 1 MG/ML
INJECTION, SOLUTION INTRAMUSCULAR; INTRAVENOUS; SUBCUTANEOUS AS NEEDED
Status: DISCONTINUED | OUTPATIENT
Start: 2020-02-11 | End: 2020-02-11 | Stop reason: HOSPADM

## 2020-02-11 RX ORDER — CIPROFLOXACIN 2 MG/ML
400 INJECTION, SOLUTION INTRAVENOUS ONCE
Status: COMPLETED | OUTPATIENT
Start: 2020-02-11 | End: 2020-02-11

## 2020-02-11 RX ORDER — MAGNESIUM SULFATE 100 %
4 CRYSTALS MISCELLANEOUS AS NEEDED
Status: DISCONTINUED | OUTPATIENT
Start: 2020-02-11 | End: 2020-02-11 | Stop reason: HOSPADM

## 2020-02-11 RX ORDER — ONDANSETRON 2 MG/ML
INJECTION INTRAMUSCULAR; INTRAVENOUS AS NEEDED
Status: DISCONTINUED | OUTPATIENT
Start: 2020-02-11 | End: 2020-02-11 | Stop reason: HOSPADM

## 2020-02-11 RX ORDER — LIDOCAINE HYDROCHLORIDE 20 MG/ML
INJECTION, SOLUTION EPIDURAL; INFILTRATION; INTRACAUDAL; PERINEURAL AS NEEDED
Status: DISCONTINUED | OUTPATIENT
Start: 2020-02-11 | End: 2020-02-11 | Stop reason: HOSPADM

## 2020-02-11 RX ADMIN — GLYCOPYRROLATE 0.2 MG: 0.2 INJECTION INTRAMUSCULAR; INTRAVENOUS at 12:55

## 2020-02-11 RX ADMIN — SODIUM CHLORIDE, SODIUM LACTATE, POTASSIUM CHLORIDE, AND CALCIUM CHLORIDE 1000 ML: 600; 310; 30; 20 INJECTION, SOLUTION INTRAVENOUS at 15:57

## 2020-02-11 RX ADMIN — MIDAZOLAM 2 MG: 1 INJECTION INTRAMUSCULAR; INTRAVENOUS at 12:55

## 2020-02-11 RX ADMIN — SODIUM CHLORIDE, SODIUM LACTATE, POTASSIUM CHLORIDE, AND CALCIUM CHLORIDE 125 ML/HR: 600; 310; 30; 20 INJECTION, SOLUTION INTRAVENOUS at 14:09

## 2020-02-11 RX ADMIN — PROPOFOL 170 MG: 10 INJECTION, EMULSION INTRAVENOUS at 12:57

## 2020-02-11 RX ADMIN — HYDROMORPHONE HYDROCHLORIDE 1 MG: 1 INJECTION, SOLUTION INTRAMUSCULAR; INTRAVENOUS; SUBCUTANEOUS at 12:57

## 2020-02-11 RX ADMIN — Medication 10 MG: at 13:20

## 2020-02-11 RX ADMIN — CIPROFLOXACIN 400 MG: 2 INJECTION, SOLUTION INTRAVENOUS at 12:55

## 2020-02-11 RX ADMIN — Medication 40 MG: at 13:20

## 2020-02-11 RX ADMIN — SODIUM CHLORIDE, SODIUM LACTATE, POTASSIUM CHLORIDE, AND CALCIUM CHLORIDE 125 ML/HR: 600; 310; 30; 20 INJECTION, SOLUTION INTRAVENOUS at 10:55

## 2020-02-11 RX ADMIN — DEXAMETHASONE SODIUM PHOSPHATE 8 MG: 4 INJECTION, SOLUTION INTRAMUSCULAR; INTRAVENOUS at 13:00

## 2020-02-11 RX ADMIN — ONDANSETRON HYDROCHLORIDE 4 MG: 2 INJECTION INTRAMUSCULAR; INTRAVENOUS at 12:55

## 2020-02-11 RX ADMIN — LIDOCAINE HYDROCHLORIDE 80 MG: 20 INJECTION, SOLUTION EPIDURAL; INFILTRATION; INTRACAUDAL; PERINEURAL at 12:57

## 2020-02-11 RX ADMIN — Medication 10 MG: at 13:01

## 2020-02-11 RX ADMIN — Medication 10 MG: at 13:11

## 2020-02-11 NOTE — PERIOP NOTES
TRANSFER - OUT REPORT:    Verbal report given to [facility] (name) on Felicia Kennedy  being transferred to Phase II (unit) for routine progression of care       Report consisted of patients Situation, Background, Assessment and   Recommendations(SBAR). Information from the following report(s) SBAR, Kardex, OR Summary, Procedure Summary, Intake/Output and MAR was reviewed with the receiving nurse. Lines:   Peripheral IV 02/11/20 Left Forearm (Active)   Site Assessment Clean, dry, & intact 2/11/2020  2:09 PM   Phlebitis Assessment 0 2/11/2020  2:09 PM   Infiltration Assessment 0 2/11/2020  2:09 PM   Dressing Status Clean, dry, & intact 2/11/2020  2:09 PM   Dressing Type Transparent;Tape 2/11/2020  2:09 PM   Hub Color/Line Status Infusing 2/11/2020  2:09 PM   Action Taken Armboard 2/11/2020  1:09 PM        Opportunity for questions and clarification was provided.       Patient transported with:   O2 @ 2 liters  Registered Nurse          \

## 2020-02-11 NOTE — BRIEF OP NOTE
BRIEF OPERATIVE NOTE    Date of Procedure: 2/11/2020   Preoperative Diagnosis: CANCER OF BLADDER NECK  Postoperative Diagnosis: CANCER OF BLADDER NECK    Procedure(s):  CYSTOSCOPY, TRANSURETHRAL RESECTION OF BLADDER TUMOR-  MEDIUM WITH INTRAVESICAL INSTILLATION OF MITOMYCIN  Surgeon(s) and Role:     * Basia Daniel MD - Primary         Surgical Assistant: NONE    Surgical Staff:  Circ-1: Venida Carlton  Scrub Tech-1: Deepa Hall  Scrub Tech-2: Chrissy Corbett  Event Time In Time Out   Incision Start 1309    Incision Close 1325      Anesthesia: General   Estimated Blood Loss: MINIMAL  Specimens:   ID Type Source Tests Collected by Time Destination   1 : Bladder tumor Preservative Bladder  Basia Daniel MD 2/11/2020 1329 Pathology      Findings: NO PAPILLARY TUMORS.  A 2.5CM RESECTION BED WAS RE-RESECTED WITH A MARGIN AROUND OF APPROX 1/2 CM MAKING FOR A 3.5CM RESECTION TOTAL. BILAT UO'S WERE NORMAL.   AREA WAS AT RIGHT LATERAL BLADDER NECK   Complications: NONE  Implants: 20 FR AQUINO CATHETER AND MITOMYCIN INSTILLED

## 2020-02-11 NOTE — PERIOP NOTES
1340: Dr. Precious Gramajo at bedside.  Recieved verbal orders to unclamp bain and remove at 1430.    1345: Family updated on patient condition

## 2020-02-11 NOTE — ANESTHESIA POSTPROCEDURE EVALUATION
Procedure(s):  CYSTOSCOPY, TRANSURETHRAL RESECTION OF BLADDER TUMOR-  MEDIUM WITH MITOMYCIN. general    Anesthesia Post Evaluation        Comments: Post-Anesthesia Evaluation and Assessment    Cardiovascular Function/Vital Signs  /60   Pulse 81   Temp 37 °C (98.6 °F)   Resp 18   Ht 5' 3\" (1.6 m)   Wt 133 kg (293 lb 4 oz)   SpO2 99%   BMI 51.95 kg/m²     Patient is status post Procedure(s):  CYSTOSCOPY, TRANSURETHRAL RESECTION OF BLADDER TUMOR-  MEDIUM WITH MITOMYCIN. Nausea/Vomiting: Controlled. Postoperative hydration reviewed and adequate. Pain:  Pain Scale 1: Numeric (0 - 10) (02/11/20 1413)  Pain Intensity 1: 0 (02/11/20 1413)   Managed. Neurological Status:   Neuro (WDL): Exceptions to WDL (02/11/20 1339)   At baseline. Mental Status and Level of Consciousness: Arousable. Pulmonary Status:   O2 Device: Room air (02/11/20 1413)   Adequate oxygenation and airway patent. Complications related to anesthesia: None    Post-anesthesia assessment completed. No concerns. Patient has met all discharge requirements. Signed By: Maria Elena Verduzco MD    February 11, 2020                   Vitals Value Taken Time   /60 2/11/2020  2:20 PM   Temp 37 °C (98.6 °F) 2/11/2020  1:39 PM   Pulse 86 2/11/2020  2:25 PM   Resp 17 2/11/2020  2:25 PM   SpO2 100 % 2/11/2020  2:25 PM   Vitals shown include unvalidated device data.

## 2020-02-11 NOTE — PERIOP NOTES
Reviewed PTA medication list with patient/caregiver and patient/caregiver denies any additional medications. Patient admits to having a responsible adult care for them for at least 24 hours after surgery.     Dual skin assessment completed by Geovanny GEIGER and Eugenia Lam RN.

## 2020-02-11 NOTE — INTERVAL H&P NOTE
H&P Update: 
Demario Tabares was seen and examined. History and physical has been reviewed. The patient has been examined. There have been no significant clinical changes since the completion of the originally dated History and Physical.   She is ready for repeat resection of her bladder tumor

## 2020-02-11 NOTE — PERIOP NOTES
Quintanilla catheter unclamped as per order. 250 cc bluish-purplish urine collected and disposed of utilizing chemo precautions. Quintanilla catheter removed and disposed of utilizing chemo precautions as per order.

## 2020-02-11 NOTE — DISCHARGE INSTRUCTIONS
Maintain chemotherapy precautions for the next 2 days as follows:  Patient to use different bathroom from other household members  After urinating pour 1 cup of bleach in the toilet and close lid and flush twice  Wash all clothing and linens with any spills of urine separate from other laundry. If urine comes in contact with skin, wash immediately with soap and water. No intercourse for 72 hours. DISCHARGE SUMMARY from Nurse    PATIENT INSTRUCTIONS:    After general anesthesia or intravenous sedation, for 24 hours or while taking prescription Narcotics:  · Limit your activities  · Do not drive and operate hazardous machinery  · Do not make important personal or business decisions  · Do  not drink alcoholic beverages  · If you have not urinated within 8 hours after discharge, please contact your surgeon on call. Report the following to your surgeon:  · Excessive pain, swelling, redness or odor of or around the surgical area  · Temperature over 100.5  · Nausea and vomiting lasting longer than 4 hours or if unable to take medications  · Any signs of decreased circulation or nerve impairment to extremity: change in color, persistent  numbness, tingling, coldness or increase pain  · Any questions    What to do at Home:  Recommended activity: Activity as tolerated and no driving for today,     If you experience any of the following symptoms as per above, please follow up with Dr. Geoffrey Barrios at [phone]. *  Please give a list of your current medications to your Primary Care Provider. *  Please update this list whenever your medications are discontinued, doses are      changed, or new medications (including over-the-counter products) are added. *  Please carry medication information at all times in case of emergency situations.     These are general instructions for a healthy lifestyle:    No smoking/ No tobacco products/ Avoid exposure to second hand smoke  Surgeon General's Warning: Quitting smoking now greatly reduces serious risk to your health. Obesity, smoking, and sedentary lifestyle greatly increases your risk for illness    A healthy diet, regular physical exercise & weight monitoring are important for maintaining a healthy lifestyle    You may be retaining fluid if you have a history of heart failure or if you experience any of the following symptoms:  Weight gain of 3 pounds or more overnight or 5 pounds in a week, increased swelling in our hands or feet or shortness of breath while lying flat in bed. Please call your doctor as soon as you notice any of these symptoms; do not wait until your next office visit. Patient armband removed and shredded    Patient armband removed and shredded  The discharge information has been reviewed with the patient and spouse. The patient and spouse verbalized understanding. Discharge medications reviewed with the patient and spouse and appropriate educational materials and side effects teaching were provided.   ___________________________________________________________________________________________________________________________________

## 2020-02-11 NOTE — ANESTHESIA PREPROCEDURE EVALUATION
Relevant Problems   No relevant active problems       Anesthetic History   No history of anesthetic complications            Review of Systems / Medical History  Patient summary reviewed, nursing notes reviewed and pertinent labs reviewed    Pulmonary  Within defined limits                 Neuro/Psych       CVA  TIA     Cardiovascular    Hypertension              Exercise tolerance: >4 METS     GI/Hepatic/Renal  Within defined limits              Endo/Other        Morbid obesity     Other Findings              Physical Exam    Airway  Mallampati: II  TM Distance: 4 - 6 cm  Neck ROM: normal range of motion   Mouth opening: Normal     Cardiovascular    Rhythm: regular  Rate: normal         Dental  No notable dental hx       Pulmonary  Breath sounds clear to auscultation               Abdominal  GI exam deferred       Other Findings            Anesthetic Plan    ASA: 3  Anesthesia type: general          Induction: Intravenous  Anesthetic plan and risks discussed with: Patient      ga/lma

## 2020-02-12 NOTE — OP NOTES
[address] REPORT    Name:  Talha Beckman  MR#:   [id number]  :  1950  ACCOUNT #:  [de-identified]  DATE OF SERVICE:  2020    PREOPERATIVE DIAGNOSIS:  Cancer of the bladder neck. POSTOPERATIVE DIAGNOSIS:  Cancer of the bladder neck. PROCEDURE PERFORMED:  Cystoscopy, transurethral resection of bladder tumor (medium), with intravesical instillation of mitomycin. SURGEON:  Liban Mccall. Glenna Garcia MD    ASSISTANT:  None. ANESTHESIA:  General.    COMPLICATIONS:  None. SPECIMENS REMOVED:  Bladder tumor. IMPLANTS:  A 20-Grenadian Quintanilla catheter and mitomycin 40 mg was instilled. ESTIMATED BLOOD LOSS:  Minimal.    FINDINGS. The patient had no papillary tumors anywhere within the bladder. There was a 2.5 cm resection bed that was re-resected at the right bladder neck with a margin of approximately 0.5 cm all the way around making for a total of a 3.5 cm resection total.  Bilateral ureteral orifices were normal in area and position and were unharmed. The area of resection was at the right bladder neck. CLINICAL NOTE:  The patient is a 51-year-old female with a history of high grade T1 TCC of the bladder, who presents for re-resection. PROCEDURE:  Preoperatively, risks and benefits of the surgery were described to the patient. The risks included, but not limited to bleeding, infection, injury to the bladder, injury to the ureters, and possible need for future procedures. The patient understood the risks and signed the informed consent. The patient was taken to the operating room and placed on the OR table in supine position. She was administered general anesthetic. She was administered intravenous antibiotics. She was placed in dorsal lithotomy position and prepped and draped in the usual sterile manner. A 25-Grenadian resectoscope and sheath were then inserted transurethrally atraumatically under direct vision using a 30-degree lens and visual obturator. Panendoscopy was done. The urethra was normal.  Right at the bladder neck, there was a prior resection bed at the approximately 9 o'clock position of the right side of bladder neck. It was extending down towards the 7 o'clock position. There was a little bit of exudate there, but no papillary tumors were seen anywhere. Bilateral ureteral orifices were in normal anatomic position. The remainder of the bladder looked fine. Next, using a bipolar loop, I resected that 2.5 cm resection bed with a 0.5 cm margin all the way around using pure cutting current. All the tumor pieces were evacuated out using an Ujogo evacuator. The area was reinspected and the inspection was clean. All bleeding was controlled with electrocautery. The bladder was allowed to empty and refill and empty and refill and no significant bleeding occurred. I then recauterized the area again, and there was noted to be no bleeding and bilateral ureteral orifices were in normal position and unharmed. There were no tumor fragments left within the bladder. At this point, I then removed the scope from the patient. I then placed a 20-Welsh Quintanilla catheter and inflated the balloon. Once the bladder was empty, I instilled 40 mg of mitomycin in the bladder and the catheter was clamped off. At this point, the patient was then taken out of lithotomy position, revived from anesthesia, and taken to Recovery in stable condition.       MD ARNOLDO [email or web address]  D:  02/11/2020 13:49  T:  02/11/2020 20:59  JOB #:  [id number]

## 2020-10-09 ENCOUNTER — HOSPITAL ENCOUNTER (OUTPATIENT)
Dept: PREADMISSION TESTING | Age: 70
Discharge: HOME | End: 2020-10-09
Payer: MEDICARE

## 2020-10-09 LAB
ATRIAL RATE: 80 BPM
CALCULATED P AXIS, ECG09: 78 DEGREES
CALCULATED R AXIS, ECG10: 29 DEGREES
CALCULATED T AXIS, ECG11: 71 DEGREES
DIAGNOSIS, 93000: NORMAL
HCT VFR BLD AUTO: 46 % (ref 35–45)
HGB BLD-MCNC: 14.6 G/DL (ref 12–16)
P-R INTERVAL, ECG05: 142 MS
Q-T INTERVAL, ECG07: 392 MS
QRS DURATION, ECG06: 86 MS
QTC CALCULATION (BEZET), ECG08: 452 MS
VENTRICULAR RATE, ECG03: 80 BPM

## 2020-10-09 PROCEDURE — 36415 COLL VENOUS BLD VENIPUNCTURE: CPT

## 2020-10-09 PROCEDURE — 85018 HEMOGLOBIN: CPT

## 2020-10-09 PROCEDURE — 93005 ELECTROCARDIOGRAM TRACING: CPT

## 2020-10-14 ENCOUNTER — HOSPITAL ENCOUNTER (OUTPATIENT)
Dept: LAB | Age: 70
Discharge: HOME | End: 2020-10-14
Payer: MEDICARE

## 2020-10-14 LAB
ANION GAP SERPL CALC-SCNC: 3 MMOL/L (ref 3–18)
BUN SERPL-MCNC: 23 MG/DL (ref 7–18)
BUN/CREAT SERPL: 22 (ref 12–20)
CALCIUM SERPL-MCNC: 8.9 MG/DL (ref 8.5–10.1)
CHLORIDE SERPL-SCNC: 110 MMOL/L (ref 100–111)
CO2 SERPL-SCNC: 29 MMOL/L (ref 21–32)
CREAT SERPL-MCNC: 1.06 MG/DL (ref 0.6–1.3)
GLUCOSE SERPL-MCNC: 105 MG/DL (ref 74–99)
POTASSIUM SERPL-SCNC: 4.8 MMOL/L (ref 3.5–5.5)
SODIUM SERPL-SCNC: 142 MMOL/L (ref 136–145)

## 2020-10-14 PROCEDURE — 80048 BASIC METABOLIC PNL TOTAL CA: CPT

## 2020-10-14 PROCEDURE — 87635 SARS-COV-2 COVID-19 AMP PRB: CPT

## 2020-10-14 PROCEDURE — 36415 COLL VENOUS BLD VENIPUNCTURE: CPT

## 2020-10-15 LAB — SARS-COV-2, COV2NT: NOT DETECTED

## 2020-10-19 RX ORDER — SODIUM CHLORIDE 0.9 % (FLUSH) 0.9 %
5-40 SYRINGE (ML) INJECTION EVERY 8 HOURS
Status: CANCELLED | OUTPATIENT
Start: 2020-10-19

## 2020-10-19 RX ORDER — FLUMAZENIL 0.1 MG/ML
0.2 INJECTION INTRAVENOUS
Status: CANCELLED | OUTPATIENT
Start: 2020-10-19

## 2020-10-19 RX ORDER — SODIUM CHLORIDE, SODIUM LACTATE, POTASSIUM CHLORIDE, CALCIUM CHLORIDE 600; 310; 30; 20 MG/100ML; MG/100ML; MG/100ML; MG/100ML
1000 INJECTION, SOLUTION INTRAVENOUS CONTINUOUS
Status: CANCELLED | OUTPATIENT
Start: 2020-10-19

## 2020-10-19 RX ORDER — NALOXONE HYDROCHLORIDE 0.4 MG/ML
0.1 INJECTION, SOLUTION INTRAMUSCULAR; INTRAVENOUS; SUBCUTANEOUS AS NEEDED
Status: CANCELLED | OUTPATIENT
Start: 2020-10-19

## 2020-10-19 RX ORDER — FENTANYL CITRATE 50 UG/ML
25 INJECTION, SOLUTION INTRAMUSCULAR; INTRAVENOUS AS NEEDED
Status: CANCELLED | OUTPATIENT
Start: 2020-10-19

## 2020-10-19 RX ORDER — HYDROMORPHONE HYDROCHLORIDE 2 MG/ML
0.5 INJECTION, SOLUTION INTRAMUSCULAR; INTRAVENOUS; SUBCUTANEOUS
Status: CANCELLED | OUTPATIENT
Start: 2020-10-19

## 2020-10-19 RX ORDER — MAGNESIUM SULFATE 100 %
4 CRYSTALS MISCELLANEOUS AS NEEDED
Status: CANCELLED | OUTPATIENT
Start: 2020-10-19

## 2020-10-19 RX ORDER — SODIUM CHLORIDE 0.9 % (FLUSH) 0.9 %
5-40 SYRINGE (ML) INJECTION AS NEEDED
Status: CANCELLED | OUTPATIENT
Start: 2020-10-19

## 2020-10-20 ENCOUNTER — HOSPITAL ENCOUNTER (OUTPATIENT)
Age: 70
Setting detail: OUTPATIENT SURGERY
Discharge: HOME | End: 2020-10-20
Attending: UROLOGY | Admitting: UROLOGY
Payer: MEDICARE

## 2020-10-20 VITALS
HEART RATE: 77 BPM | OXYGEN SATURATION: 100 % | RESPIRATION RATE: 20 BRPM | SYSTOLIC BLOOD PRESSURE: 127 MMHG | TEMPERATURE: 98.5 F | DIASTOLIC BLOOD PRESSURE: 45 MMHG

## 2020-10-20 RX ORDER — LEVOFLOXACIN 5 MG/ML
500 INJECTION, SOLUTION INTRAVENOUS ONCE
Status: DISCONTINUED | OUTPATIENT
Start: 2020-10-20 | End: 2020-10-20 | Stop reason: HOSPADM

## 2020-10-20 RX ORDER — SODIUM CHLORIDE, SODIUM LACTATE, POTASSIUM CHLORIDE, CALCIUM CHLORIDE 600; 310; 30; 20 MG/100ML; MG/100ML; MG/100ML; MG/100ML
125 INJECTION, SOLUTION INTRAVENOUS CONTINUOUS
Status: DISCONTINUED | OUTPATIENT
Start: 2020-10-20 | End: 2020-10-20 | Stop reason: HOSPADM

## 2020-10-20 RX ORDER — LEVOFLOXACIN 500 MG/1
500 TABLET, FILM COATED ORAL DAILY
Status: ON HOLD | COMMUNITY
End: 2020-10-27 | Stop reason: SDUPTHER

## 2020-10-20 NOTE — PERIOP NOTES
Notified Dr. Tejal Jeffries about patient taking plavix 75 mg on 10/16/20. He states he will be able to do procedure today; however, if anything more is found during surgery he will not be able address it in surgery today. Patient was told situation and states she would like to reschedule for another time. Dr. Tejal Jeffries states patient can reschedule and should stay off Plavix until rescheduled or given follow up. He states [facility] will call patient and follow up with reschedule plan. Notified patient to stayy off plavix for at least 5 days, preferrably 7 per Dr. eTjal Jeffries. Notified patient to follow up with office later today.

## 2020-10-21 ENCOUNTER — HOSPITAL ENCOUNTER (OUTPATIENT)
Dept: PREADMISSION TESTING | Age: 70
Discharge: HOME | End: 2020-10-21
Payer: MEDICARE

## 2020-10-21 PROCEDURE — 87635 SARS-COV-2 COVID-19 AMP PRB: CPT

## 2020-10-23 LAB — SARS-COV-2, COV2NT: NOT DETECTED

## 2020-10-27 ENCOUNTER — ANESTHESIA EVENT (OUTPATIENT)
Dept: SURGERY | Age: 70
End: 2020-10-27
Payer: MEDICARE

## 2020-10-27 ENCOUNTER — HOSPITAL ENCOUNTER (OUTPATIENT)
Age: 70
Setting detail: OUTPATIENT SURGERY
Discharge: HOME | End: 2020-10-27
Attending: UROLOGY | Admitting: UROLOGY
Payer: MEDICARE

## 2020-10-27 ENCOUNTER — ANESTHESIA (OUTPATIENT)
Dept: SURGERY | Age: 70
End: 2020-10-27
Payer: MEDICARE

## 2020-10-27 ENCOUNTER — APPOINTMENT (OUTPATIENT)
Dept: GENERAL RADIOLOGY | Age: 70
End: 2020-10-27
Attending: UROLOGY
Payer: MEDICARE

## 2020-10-27 VITALS
HEIGHT: 65 IN | BODY MASS INDEX: 47.86 KG/M2 | TEMPERATURE: 97.6 F | HEART RATE: 80 BPM | OXYGEN SATURATION: 100 % | DIASTOLIC BLOOD PRESSURE: 50 MMHG | SYSTOLIC BLOOD PRESSURE: 135 MMHG | WEIGHT: 287.25 LBS | RESPIRATION RATE: 20 BRPM

## 2020-10-27 DIAGNOSIS — R82.89 ABNORMAL URINE CYTOLOGY: Primary | ICD-10-CM

## 2020-10-27 PROCEDURE — 76010000138 HC OR TIME 0.5 TO 1 HR: Performed by: UROLOGY

## 2020-10-27 PROCEDURE — 77030040361 HC SLV COMPR DVT MDII -B: Performed by: UROLOGY

## 2020-10-27 PROCEDURE — 74011000250 HC RX REV CODE- 250: Performed by: ANESTHESIOLOGY

## 2020-10-27 PROCEDURE — 77030012508 HC MSK AIRWY LMA AMBU -A: Performed by: ANESTHESIOLOGY

## 2020-10-27 PROCEDURE — 74011250636 HC RX REV CODE- 250/636: Performed by: ANESTHESIOLOGY

## 2020-10-27 PROCEDURE — 74420 UROGRAPHY RTRGR +-KUB: CPT

## 2020-10-27 PROCEDURE — 76210000016 HC OR PH I REC 1 TO 1.5 HR: Performed by: UROLOGY

## 2020-10-27 PROCEDURE — 77030020782 HC GWN BAIR PAWS FLX 3M -B: Performed by: UROLOGY

## 2020-10-27 PROCEDURE — 88112 CYTOPATH CELL ENHANCE TECH: CPT

## 2020-10-27 PROCEDURE — 2709999900 HC NON-CHARGEABLE SUPPLY: Performed by: UROLOGY

## 2020-10-27 PROCEDURE — 77030013079 HC BLNKT BAIR HGGR 3M -A: Performed by: ANESTHESIOLOGY

## 2020-10-27 PROCEDURE — 88121 CYTP URINE 3-5 PROBES CMPTR: CPT

## 2020-10-27 PROCEDURE — 74011250637 HC RX REV CODE- 250/637: Performed by: ANESTHESIOLOGY

## 2020-10-27 PROCEDURE — C1758 CATHETER, URETERAL: HCPCS | Performed by: UROLOGY

## 2020-10-27 PROCEDURE — C1769 GUIDE WIRE: HCPCS | Performed by: UROLOGY

## 2020-10-27 PROCEDURE — 74011250636 HC RX REV CODE- 250/636: Performed by: UROLOGY

## 2020-10-27 PROCEDURE — 74011000636 HC RX REV CODE- 636: Performed by: UROLOGY

## 2020-10-27 PROCEDURE — 88305 TISSUE EXAM BY PATHOLOGIST: CPT

## 2020-10-27 PROCEDURE — 76210000026 HC REC RM PH II 1 TO 1.5 HR: Performed by: UROLOGY

## 2020-10-27 PROCEDURE — 76060000032 HC ANESTHESIA 0.5 TO 1 HR: Performed by: UROLOGY

## 2020-10-27 RX ORDER — HYDROMORPHONE HYDROCHLORIDE 1 MG/ML
0.2 INJECTION, SOLUTION INTRAMUSCULAR; INTRAVENOUS; SUBCUTANEOUS AS NEEDED
Status: DISCONTINUED | OUTPATIENT
Start: 2020-10-27 | End: 2020-10-27 | Stop reason: HOSPADM

## 2020-10-27 RX ORDER — FENTANYL CITRATE 50 UG/ML
INJECTION, SOLUTION INTRAMUSCULAR; INTRAVENOUS AS NEEDED
Status: DISCONTINUED | OUTPATIENT
Start: 2020-10-27 | End: 2020-10-27 | Stop reason: HOSPADM

## 2020-10-27 RX ORDER — FLUMAZENIL 0.1 MG/ML
0.2 INJECTION INTRAVENOUS
Status: DISCONTINUED | OUTPATIENT
Start: 2020-10-27 | End: 2020-10-27 | Stop reason: HOSPADM

## 2020-10-27 RX ORDER — TRAMADOL HYDROCHLORIDE 50 MG/1
50 TABLET ORAL
Qty: 15 TAB | Refills: 0 | Status: SHIPPED | OUTPATIENT
Start: 2020-10-27 | End: 2020-11-01

## 2020-10-27 RX ORDER — SODIUM CHLORIDE, SODIUM LACTATE, POTASSIUM CHLORIDE, CALCIUM CHLORIDE 600; 310; 30; 20 MG/100ML; MG/100ML; MG/100ML; MG/100ML
25 INJECTION, SOLUTION INTRAVENOUS CONTINUOUS
Status: DISCONTINUED | OUTPATIENT
Start: 2020-10-27 | End: 2020-10-27 | Stop reason: HOSPADM

## 2020-10-27 RX ORDER — LEVOFLOXACIN 5 MG/ML
500 INJECTION, SOLUTION INTRAVENOUS ONCE
Status: COMPLETED | OUTPATIENT
Start: 2020-10-27 | End: 2020-10-27

## 2020-10-27 RX ORDER — PROPOFOL 10 MG/ML
INJECTION, EMULSION INTRAVENOUS AS NEEDED
Status: DISCONTINUED | OUTPATIENT
Start: 2020-10-27 | End: 2020-10-27 | Stop reason: HOSPADM

## 2020-10-27 RX ORDER — TRAMADOL HYDROCHLORIDE 50 MG/1
50 TABLET ORAL
Qty: 15 TAB | Refills: 0 | Status: SHIPPED | OUTPATIENT
Start: 2020-10-27 | End: 2020-10-30

## 2020-10-27 RX ORDER — PHENAZOPYRIDINE HYDROCHLORIDE 100 MG/1
100 TABLET, FILM COATED ORAL
Qty: 9 TAB | Refills: 0 | Status: SHIPPED | OUTPATIENT
Start: 2020-10-27 | End: 2020-10-30

## 2020-10-27 RX ORDER — ALBUTEROL SULFATE 0.83 MG/ML
2.5 SOLUTION RESPIRATORY (INHALATION)
Status: DISCONTINUED | OUTPATIENT
Start: 2020-10-27 | End: 2020-10-27 | Stop reason: HOSPADM

## 2020-10-27 RX ORDER — LIDOCAINE HYDROCHLORIDE 20 MG/ML
INJECTION, SOLUTION EPIDURAL; INFILTRATION; INTRACAUDAL; PERINEURAL AS NEEDED
Status: DISCONTINUED | OUTPATIENT
Start: 2020-10-27 | End: 2020-10-27 | Stop reason: HOSPADM

## 2020-10-27 RX ORDER — LEVOFLOXACIN 500 MG/1
500 TABLET, FILM COATED ORAL DAILY
Qty: 3 TAB | Refills: 0 | Status: SHIPPED | OUTPATIENT
Start: 2020-10-28 | End: 2020-10-31

## 2020-10-27 RX ORDER — HYDROMORPHONE HYDROCHLORIDE 1 MG/ML
0.5 INJECTION, SOLUTION INTRAMUSCULAR; INTRAVENOUS; SUBCUTANEOUS
Status: DISCONTINUED | OUTPATIENT
Start: 2020-10-27 | End: 2020-10-27 | Stop reason: HOSPADM

## 2020-10-27 RX ORDER — ONDANSETRON 2 MG/ML
INJECTION INTRAMUSCULAR; INTRAVENOUS AS NEEDED
Status: DISCONTINUED | OUTPATIENT
Start: 2020-10-27 | End: 2020-10-27 | Stop reason: HOSPADM

## 2020-10-27 RX ORDER — MIDAZOLAM HYDROCHLORIDE 1 MG/ML
INJECTION, SOLUTION INTRAMUSCULAR; INTRAVENOUS AS NEEDED
Status: DISCONTINUED | OUTPATIENT
Start: 2020-10-27 | End: 2020-10-27 | Stop reason: HOSPADM

## 2020-10-27 RX ORDER — DIPHENHYDRAMINE HYDROCHLORIDE 50 MG/ML
12.5 INJECTION, SOLUTION INTRAMUSCULAR; INTRAVENOUS
Status: DISCONTINUED | OUTPATIENT
Start: 2020-10-27 | End: 2020-10-27 | Stop reason: HOSPADM

## 2020-10-27 RX ORDER — EPHEDRINE SULFATE/0.9% NACL/PF 50 MG/5 ML
SYRINGE (ML) INTRAVENOUS AS NEEDED
Status: DISCONTINUED | OUTPATIENT
Start: 2020-10-27 | End: 2020-10-27 | Stop reason: HOSPADM

## 2020-10-27 RX ORDER — SODIUM CHLORIDE, SODIUM LACTATE, POTASSIUM CHLORIDE, CALCIUM CHLORIDE 600; 310; 30; 20 MG/100ML; MG/100ML; MG/100ML; MG/100ML
125 INJECTION, SOLUTION INTRAVENOUS CONTINUOUS
Status: DISCONTINUED | OUTPATIENT
Start: 2020-10-27 | End: 2020-10-27 | Stop reason: HOSPADM

## 2020-10-27 RX ORDER — DEXAMETHASONE SODIUM PHOSPHATE 4 MG/ML
INJECTION, SOLUTION INTRA-ARTICULAR; INTRALESIONAL; INTRAMUSCULAR; INTRAVENOUS; SOFT TISSUE AS NEEDED
Status: DISCONTINUED | OUTPATIENT
Start: 2020-10-27 | End: 2020-10-27 | Stop reason: HOSPADM

## 2020-10-27 RX ORDER — ONDANSETRON 2 MG/ML
4 INJECTION INTRAMUSCULAR; INTRAVENOUS ONCE
Status: DISCONTINUED | OUTPATIENT
Start: 2020-10-27 | End: 2020-10-27 | Stop reason: HOSPADM

## 2020-10-27 RX ORDER — HYDROCODONE BITARTRATE AND ACETAMINOPHEN 5; 325 MG/1; MG/1
1 TABLET ORAL AS NEEDED
Status: DISCONTINUED | OUTPATIENT
Start: 2020-10-27 | End: 2020-10-27 | Stop reason: HOSPADM

## 2020-10-27 RX ADMIN — DEXAMETHASONE SODIUM PHOSPHATE 4 MG: 4 INJECTION, SOLUTION INTRAMUSCULAR; INTRAVENOUS at 11:23

## 2020-10-27 RX ADMIN — Medication 10 MG: at 11:35

## 2020-10-27 RX ADMIN — HYDROCODONE BITARTRATE AND ACETAMINOPHEN 1 TABLET: 5; 325 TABLET ORAL at 12:52

## 2020-10-27 RX ADMIN — MIDAZOLAM 2 MG: 1 INJECTION INTRAMUSCULAR; INTRAVENOUS at 11:15

## 2020-10-27 RX ADMIN — PROPOFOL 30 MG: 10 INJECTION, EMULSION INTRAVENOUS at 11:55

## 2020-10-27 RX ADMIN — FENTANYL CITRATE 25 MCG: 50 INJECTION, SOLUTION INTRAMUSCULAR; INTRAVENOUS at 12:01

## 2020-10-27 RX ADMIN — LIDOCAINE HYDROCHLORIDE 60 MG: 20 INJECTION, SOLUTION EPIDURAL; INFILTRATION; INTRACAUDAL; PERINEURAL at 11:20

## 2020-10-27 RX ADMIN — PROPOFOL 50 MG: 10 INJECTION, EMULSION INTRAVENOUS at 11:26

## 2020-10-27 RX ADMIN — SODIUM CHLORIDE, SODIUM LACTATE, POTASSIUM CHLORIDE, AND CALCIUM CHLORIDE 125 ML/HR: 600; 310; 30; 20 INJECTION, SOLUTION INTRAVENOUS at 09:33

## 2020-10-27 RX ADMIN — PROPOFOL 150 MG: 10 INJECTION, EMULSION INTRAVENOUS at 11:20

## 2020-10-27 RX ADMIN — FENTANYL CITRATE 25 MCG: 50 INJECTION, SOLUTION INTRAMUSCULAR; INTRAVENOUS at 11:29

## 2020-10-27 RX ADMIN — FENTANYL CITRATE 25 MCG: 50 INJECTION, SOLUTION INTRAMUSCULAR; INTRAVENOUS at 11:55

## 2020-10-27 RX ADMIN — SODIUM CHLORIDE, SODIUM LACTATE, POTASSIUM CHLORIDE, AND CALCIUM CHLORIDE: 600; 310; 30; 20 INJECTION, SOLUTION INTRAVENOUS at 11:38

## 2020-10-27 RX ADMIN — Medication 10 MG: at 11:49

## 2020-10-27 RX ADMIN — SODIUM CHLORIDE, SODIUM LACTATE, POTASSIUM CHLORIDE, AND CALCIUM CHLORIDE: 600; 310; 30; 20 INJECTION, SOLUTION INTRAVENOUS at 11:14

## 2020-10-27 RX ADMIN — FENTANYL CITRATE 25 MCG: 50 INJECTION, SOLUTION INTRAMUSCULAR; INTRAVENOUS at 11:25

## 2020-10-27 RX ADMIN — SODIUM CHLORIDE, SODIUM LACTATE, POTASSIUM CHLORIDE, AND CALCIUM CHLORIDE 125 ML/HR: 600; 310; 30; 20 INJECTION, SOLUTION INTRAVENOUS at 12:31

## 2020-10-27 RX ADMIN — LEVOFLOXACIN 500 MG: 5 INJECTION, SOLUTION INTRAVENOUS at 11:19

## 2020-10-27 RX ADMIN — ONDANSETRON HYDROCHLORIDE 4 MG: 2 INJECTION INTRAMUSCULAR; INTRAVENOUS at 11:33

## 2020-10-27 NOTE — ANESTHESIA PREPROCEDURE EVALUATION
Relevant Problems   No relevant active problems       Anesthetic History   No history of anesthetic complications            Review of Systems / Medical History  Patient summary reviewed, nursing notes reviewed and pertinent labs reviewed    Pulmonary                   Neuro/Psych       CVA  TIA    Comments: No residual Cardiovascular    Hypertension          Hyperlipidemia         GI/Hepatic/Renal                Endo/Other        Morbid obesity     Other Findings            Physical Exam    Airway  Mallampati: II  TM Distance: 4 - 6 cm  Neck ROM: normal range of motion        Cardiovascular    Rhythm: regular  Rate: normal         Dental  No notable dental hx       Pulmonary  Breath sounds clear to auscultation               Abdominal  GI exam deferred       Other Findings            Anesthetic Plan    ASA: 3  Anesthesia type: general          Induction: Intravenous  Anesthetic plan and risks discussed with: Patient

## 2020-10-27 NOTE — H&P
Urology [address]  [address] [facility] [place] [address]  Tel: [phone]  Fax: [phone]      Patient: Kimberley Eason  YOB: 1950          Completed Orders (This Visit)  Order Details Reason Side Interpretation Result Initial Treatment Date Region   Gemcitabine HCl     Gemcitabine # 3 of 3. JA     URINALYSIS, AUTO, W/O SCOPE    see detail Test 1Color: yellow. Clarity: clear. Glucose: negative. Bilirubin: negative. Ketones: negative. Specific Gravity: 1.025. Blood: moderate. pH: 5.5. Protein: negative. Urobilinogen: 0.2 mg/dl  Nitrite: negative. Leukocytes: trace. Assessment/Plan  # Detail Type Description    1. Assessment Abnormal urine cytology (R82.89). Patient Plan She has a persistently positive urine fish. We discussed that she may have active tumor in her upper urinary tracts. We discussed she may have carcinoma in situ within the bladder or a new bladder tumor. We will do a cystoscopy selective urine cytology. We will do bladder biopsies or a TURBT. Bilateral retrograde pyelograms with a possible left or right ureteroscopy. Risks of bleeding infection injury and possible need for future procedures were discussed. We also discussed that this abnormal FI SH test may simply be either a false positive or a predictive positive. 2. Assessment Malignant neoplasm of bladder neck (C67.5). Patient Plan She finished another 3 weeks of gemcitabine. We will do cystoscopy with possible bladder biopsies or TURBT in the OR ASAP. Plan Orders The patient had the following order(s) completed today: URINALYSIS, AUTO, W/O SCOPE. Obtained on 09/23/2020, Interpretation: see detail, Result details: Test 1Color: yellow. Clarity: clear. Glucose: negative. Bilirubin: negative. Ketones: negative. Specific Gravity: 1.025. Blood: moderate. pH: 5.5. Protein: negative. Urobilinogen: 0.2 mg/dl  Nitrite: negative. Leukocytes: trace.  The patient had the following order(s) completed today: Gemcitabine HCl. Obtained on 09/23/2020, Reason/comments: Natalie Bosch [id number]. Strength 1 g via Bladder instillation, Result details: Gemcitabine # 3 of 3. JA.         3. Assessment Frequency of urination (R35.0). Patient Plan She is overall doing okay. 4. Assessment Kidney stone (N20.0). 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 We will likely need tx for that after we know she is known to be cancer free                   This 78year old female presents for Urinary Retention, Bladder CA, Kidney and/or Ureteral Stone and Hematuria. History of Present Illness:  1. Urinary Retention   Onset was 10 months ago. Severity level is severe. It occurs constantly. The problem is improving. Associated symptoms include hematuria (gross), incomplete emptying, pressure and slow stream. Pertinent negatives include chills, constipation and fever. Additional information: She went into retention after not really having any problems previously. She is did okay with the bain. her  catheter is out and she is voiding but not as good as baseline. CWP=233xu (12/6/19)         COI=562 (12/13/19)       1/3/2019 - s/p TURBT, she is voidn much better bust stillh as some frequency and urgency. 5/29/2020 = she is voiding fine without straining. Good flow and emptying.     6/25/2020 -- She is 3 treatments with gemcitbine into treatment. Her flow is fine. She has some mild frequency. No retention or inability to empty though. 7/16/2020 -- she has finished a 6 week induction course of gemcitabine. She denies any significant dysuria. Stream has been moderate throughout the situation. No significant urgency or frequency. No dysuria. 9/2020 -- She is doing fine and voiding fine w/o pain or dysuria. Her flow is strong and this is not a problem lately. 2.  Bladder CA   The patient is here today for scheduled follow up. The patient's status is improved.  She was diagnosed on 12/31/2019. Pertinent history includes being over 38years old,  race and Patient has never smoked. The patient denies chest pain or nausea. Additional information: 1/3/2020 -- She is doing well s/p TURBT and mitomycin. no gross hematuria.  some slowed stream at times. 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 No pain or troubles voiding. Path (2/11/2020) = negative. 5/14/2020 -- unfortunately she has not received her BCG. She was scheduled to start it after her repeat resection, but then she canceled. When we tried to get her on the phone, she either never picked up her phone or, when she did, she went on a tie rated about how she had too many personal things going on in her life to proceed with treatment. She denies any suprapubic pain or dysuria or hematuria. Urine cytology (5/14/2020) -- atypical cells    urine FISH (5/14/2020) = POSITIVE    5/29/2020 -- she denies any hematuria or flank pain. Her appetite is fine. No dysuria or significant urgency or frequency. 6/25/2020 -- No gross hematurai with the gemcitabine treatments. No fevers. Good apepetite. No abd pain. she toelrates it well. 7/16/2020 -- no weight loss or new voiding concerns. She finished 6 weeks of gemcitabine. Appetite is fine. Energy is good. She feels mostly normal.    8/2020 -- She is doing  fine w/o pelvic pain or hematuria. she tolerated her induction course of gemcitabine well. cytology (5/2020, 8/2020) = + FISH    9/23/2020 -- she has received 3 more doses of intravesical gemcitabine. The 1st 1 resulted in some lower abdominal pain/dysuria but she has tolerated the subsequent 2 very well. No nausea or vomiting or troubles with bowel movements. No voiding difficulties. Good appetite. No weight loss. She basically feels normal  3.   Kidney and/or Ureteral Stone   Onset was 9 months ago. Severity level is no pain. It occurs constantly. The problem is with no change. Location of pain is right flank. The pain does not radiate. Associated symptoms include hematuria (gross). Pertinent negatives include chills, constipation, diarrhea, fever, nausea, pain and vomiting. Additional information: CT (11/27/19 at [facility]) -- Right lower pole staghorn going into renal pelvis in a moderqtely smaller but still functional right kidney and a right upper pole stone too. .      2/2020 -- no flank pain or radiation or UTI or pyelo. CT (1/2020) showspersistent right staghorn stone    5/14/2020 -- no flank pain or hematuria. No UTI. No unexplained nausea. No aggravating issues or significant symptoms whatsoever. She states she would hardly know that she even had a stone if I would in keep telling her. 8/13/2020 -- no flank pain. No fevers or UTIs. No nausea. 9/2020 -- No flank pain  or hematuria. No UTI. 4.  Hematuria   The patient presents with hematuria (gross). The problem began 10 months ago. The symptoms are intermittent. The problem has resolved. She denies pain. Pertinent history includes being at least 38years of age but not history of UTIs and family history of stones or prior prostate surgeries. She also complains of incomplete emptying, pressure and slow stream. She denies chills, fever, nausea and vomiting. Additional information: She had gross hematuria just prior to going into retention. Cytology = POSITIVE (12/2019)     CT = stone    CT -3 phase (1/2020) = negative except her stone    7/16/2020 --she denies any suprapubic pain or hematuria or flank pain. Nursing Comments  Intake Comments: TX # 3 of 3 gemcitabine 1g/26.3ml bladder instillation. (alternative to bcg which is backordered since 2019 )  signed   consent for tx. Post procedure tx instructions reviewed with patient.  Pt saw Leonardo Dunaway after tx. Anirudh Vang        PAST MEDICAL/SURGICAL HISTORY   (Reviewed, updated)    Disease/disorder Onset Date Management Date Comments     Cysto,  TURBT (medium)and intravesical instillation of mitomycin 2020      Cysto, TURBT (large), intraavesical instillation of mitomycin 2019      Right foot plantar fascitis surgery     Atrial Flutter       Dyspnea       Appendectomy       Total Hysterectomy              Endoscopy       Cancer, uterine         Hysterectomy       Appendectomy     Hypertension       Stroke       Obesity             PROBLEM LIST:   Problem List reviewed. Problem Description Onset Date Chronic Clinical Status Notes   Hypertension  Y     TIA  Y     Hyperlipidemia  Y           Medications (active prior to today)  Medication Name Sig Description Start Date Stop Date Refilled Rx Elsewhere   lisinopril 20 mg tablet take 1 tablet by oral route 2 times every day //   Y   Plavix 75 mg tablet take 1 tablet by oral route  every day //   Y   simvastatin 40 mg tablet take 1 tablet by oral route  every day in the evening //   Y   Levaquin 500 mg tablet take 1 tablet by oral route  every 24 hours 2020  N     Medication Reconciliation  Medications reconciled today. Medication Reviewed  Adherence Medication Name Sig Desc Elsewhere Status   taking as directed lisinopril 20 mg tablet take 1 tablet by oral route 2 times every day Y Verified   taking as directed Plavix 75 mg tablet take 1 tablet by oral route  every day Y Verified   taking as directed simvastatin 40 mg tablet take 1 tablet by oral route  every day in the evening Y Verified     Allergies  Ingredient Reaction (Severity) Medication Name Comment   PENICILLINS        Reviewed, no changes.   Family History  (Reviewed, updated)  Relationship Family Member Name  Age at Death Condition Onset Age Cause of Death       No family history of Emphysema  N   Family h/o    Cancer - uterine     Father  Y  Coronary artery disease  N   Maternal grandfather Cancer - lung           Social History:  (Reviewed, updated)  Preferred language is [place]. MARITAL STATUS/FAMILY/SOCIAL SUPPORT  Marital status:    Tobacco use status: Current non-smoker. Smoking status: Never smoker. TOBACCO SCREENING:  Patient has never used tobacco.       ALCOHOL  There is a history of alcohol use. consumed rarely. Review of Systems  System Neg/Pos Details   Constitutional Negative Chills, Fever and Pain. ENMT Negative Ear infections and Sore throat. Eyes Negative Blurred vision, Double vision and Eye pain. Respiratory Negative Asthma, Chronic cough, Dyspnea and Wheezing. Cardio Negative Chest pain. GI Negative Constipation, Decreased appetite, Diarrhea, Nausea and Vomiting.  Positive Hematuria, Incomplete emptying, Pressure, Slow stream.   Endocrine Negative Cold intolerance, Heat intolerance, Increased thirst and Weight loss. Neuro Negative Headache and Tremors. Psych Negative Anxiety and Depression. Integumentary Negative Itching skin and Rash. MS Negative Back pain and Joint pain. Hema/Lymph Negative Easy bleeding. Physical Exam  Exam Findings Details   Constitutional Normal Well developed. Neck Exam Normal Inspection - Normal.   Respiratory Normal Inspection - Normal.   Abdomen Normal No abdominal tenderness. Genitourinary Normal No Suprapubic tenderness. No CVA tenderness. Extremity Normal No Edema. Psychiatric Normal Orientation - Oriented to time, place, person & situation. Appropriate mood and affect. Procedures:      Catheterization/intravesical treatment:  Indication for procedure:    Malignant neoplasm of bladder neck  C67.5. Patient consent:   Consent was obtained. Questions were encouraged and answered. The procedure and risks were explained in detail. Catheterization:  Insertion of a non-indwelling catheter was performed. The Madi Pal catheter size is 14. The catheter type was straight cath. Intravesical treatment:  Treatment episode: 3 of 3. A catheter was inserted into the bladder. The following anesthetic/medications have been provided:   Other medication: Gemcitabine 1g   Post procedure:  Patient tolerated the procedure well.         Medications (added, continued, or stopped today)  Start Date Medication Directions PRN Status PRN Reason Instruction Stop Date   09/09/2020 Levaquin 500 mg tablet take 1 tablet by oral route  every 24 hours N   09/23/2020    lisinopril 20 mg tablet take 1 tablet by oral route 2 times every day N       Plavix 75 mg tablet take 1 tablet by oral route  every day N       simvastatin 40 mg tablet take 1 tablet by oral route  every day in the evening N      Active Patient Care Team Members    Name Contact Agency Type Support Role Relationship Active Date Inactive Date Specialty   Suellen Samuels   Patient provider PCP      Saurabh Wahl   Emergency Contact Spouse      Curt Cruz   encounter provider    Urology   Mauricio Moreno   encounter provider    Pulmonary Medicine       Provider:     Blake Jason MD

## 2020-10-27 NOTE — PERIOP NOTES
TRANSFER - OUT REPORT:    Verbal report given to JUANJO Swain(name) on Connie Diaz  being transferred to phase 2(unit) for routine post - op       Report consisted of patients Situation, Background, Assessment and   Recommendations(SBAR). Information from the following report(s) SBAR, Kardex, OR Summary, Procedure Summary, Intake/Output and MAR was reviewed with the receiving nurse. Lines:   Peripheral IV 43/03/20 Right Cephalic (Active)   Site Assessment Clean, dry, & intact 10/27/20 1208   Phlebitis Assessment 0 10/27/20 1208   Infiltration Assessment 0 10/27/20 1208   Dressing Status Clean, dry, & intact 10/27/20 1208   Dressing Type Transparent;Tape 10/27/20 1208   Hub Color/Line Status Pink; Infusing 10/27/20 1208   Alcohol Cap Used No 10/27/20 0932        Opportunity for questions and clarification was provided.       Patient transported with:   Registered Nurse  Tech

## 2020-10-27 NOTE — ANESTHESIA POSTPROCEDURE EVALUATION
Post-Anesthesia Evaluation & Assessment    Visit Vitals  BP (!) 135/50   Pulse 79   Temp 37 °C (98.6 °F)   Resp 18   Ht 5' 5\" (1.651 m)   Wt 130.3 kg (287 lb 4 oz)   SpO2 96%   BMI 47.80 kg/m²       Nausea/Vomiting: no nausea and no vomiting    Post-operative hydration adequate. Pain score (VAS): 0    Mental status & Level of consciousness: alert and oriented x 3    Neurological status: moves all extremities, sensation grossly intact    Pulmonary status: airway patent, no supplemental oxygen required    Complications related to anesthesia: none    Patient has met all discharge requirements. Additional comments:  Procedure(s):  CYSTOSCOPY, SELECTIVE CYTOLOGY, BILATERAL RETROGRADE PYELOGRAM WITH INTERPRETATION, BLADDER BIOPSY W/C-ARM. general    <BSHSIANPOST>    INITIAL Post-op Vital signs:   Vitals Value Taken Time   /50 10/27/2020  2:13 PM   Temp 37 °C (98.6 °F) 10/27/2020  1:25 PM   Pulse 81 10/27/2020  2:19 PM   Resp 18 10/27/2020  1:25 PM   SpO2 98 % 10/27/2020  2:19 PM   Vitals shown include unvalidated device data.

## 2020-10-27 NOTE — PROGRESS NOTES
Pt left stable with Family*. Folder with discharge instruction given. Arm band shredded. IV access discontinue. No further questions. End of PeriOp care. 10/27/20 1413   Modified Flakito Score   Activity 2   Respiration 2   Circulation 2   Consciousness 2   O2 Saturation 2   Flakito Score 10   Vital Signs   Temp 97.6 °F (36.4 °C)   Temp Source Temporal   Pulse (Heart Rate) 79   Resp Rate 20   BP (!) 135/50   Oxygen Therapy   O2 Sat (%) 96 %   O2 Device Room air   Pain   Pain Scale 1 Numeric (0 - 10)   Pain Intensity 1 2   Respiratory   Respiratory (WDL) WDL   Cardiac   Cardiac (WDL) WDL   Neuro   Neuro (WDL) WDL   Neurologic State Alert   LUE Motor Response Purposeful   LLE Motor Response Purposeful   RUE Motor Response Purposeful   RLE Motor Response Purposeful   Skin Integumentary   Skin Integumentary (WDL) WDL   Peripheral Vascular   Peripheral Vascular (WDL) WDL   Position/ Safety   Safety All side rails up;Bed in lower position; Fall precaution;Call light within reach; Wheels locked   Musculoskeletal   Musculoskeletal (WDL) WDL

## 2020-10-27 NOTE — OP NOTES
[facility]  OPERATIVE REPORT    Name:  Telma Thakkar  MR#:   [id number]  :  1950  ACCOUNT #:  [de-identified]  DATE OF SERVICE:  10/27/2020    PREOPERATIVE DIAGNOSES:  Cancer of the bladder neck and abnormal urine cytology. POSTOPERATIVE DIAGNOSES:  Cancer of the bladder neck and abnormal urine cytology. PROCEDURE PERFORMED:  Cystoscopy, selective cytologies, bilateral retrograde pyelogram with interpretation, bladder biopsy and fulguration. SURGEON:  Herve Flower MD    ASSISTANT:  None. ANESTHESIA:  General.    COMPLICATIONS:  None. SPECIMENS REMOVED:  Bladder biopsy and bladder washings, right kidney and ureteral washings and left kidney and ureteral washings. IMPLANTS:  None. DRAINS:  None. ESTIMATED BLOOD LOSS:  Minimal.    FINDINGS:  The patient had diffuse patchy erythema throughout the bladder, especially on the right sidewall and left bladder neck. There was a stone in the right renal pelvis that was known about. Otherwise, she had normal retrograde pyelograms. INDICATIONS:  The patient is a 71-year-old female with a history of high-grade t1 TCC of the bladder. She has had intravesical treatment and she was noted to have a persistently positive urine FISH test.  She presents for further evaluation. PROCEDURE:  Preoperatively, risks and benefits of the surgery were described to the patient. The risks included but not limited to bleeding, infection, injury to bladder, injury to the urethra, injury to the ureters, and injury to the kidneys. The patient understood, signed the informed consent. The patient was taken to the operating room and placed on the OR table in supine position. She was administered general anesthetic. She was administered intravenous antibiotics. She was placed in dorsal lithotomy position, prepped and draped in usual sterile manner.   A 22-[place]er cystoscope and sheath were then inserted transurethrally atraumatically under direct vision using a 30-degree lens. Panendoscopy was done. Bilateral ureteral orifices were in normal anatomic position. There were no papillary tumors or stones within the bladder. However, there was some patchy erythema diffusely spread throughout the bladder but mostly concentrated on the right sidewall and the left bladder neck. Bilateral ureteral orifices were in normal position. Next, I emptied the bladder. I filled the bladder with saline and emptied again. I then filled it with saline and emptied it directly into a specimen cup. This was sent off as a bladder wash. I then cannulated the right ureteral orifice with a 5-[place] open-ended ureteral catheter and fished it all the way up the towards the kidney. I collected the urine dripping from the renal pelvis and from the ureter. As I did this, I slowly backed the catheter down the ureter. I then went up to the kidney and injected some saline into the renal pelvis and then collected the drippings from the renal pelvis all the way down the ureter. I did this until I had over 20 mL of urine collected from the right side. This was then sent off for urine cytology and FISH. I then did a right retrograde pyelogram with 12 mL of Visipaque dye. There were no filling defects or abnormal contours along the entire length of the ureter. Up in the kidney, there was the known filling defect of the stone in the renal pelvis towards the lower pole. There was mild hydronephrosis and distention of the renal pelvis and mild blunting of calyces. No other filling defects or abnormalities were seen. Next, I removed the open-ended catheter and threw it away. I then cannulated the left ureteral orifice and repeated everything on the left side. I sent the open-ended catheter all the way up to the kidney. I collected the urine from the kidney and the ureter.   I then injected approximately 10 to 15 mL of saline in the renal pelvis and along the ureter and I collected the urine drippings from the left renal pelvis and left ureter again. Once I had over 20 mL, I then did a retrograde pyelogram in the usual manner with 14 mL of Visipaque dye. There were no filling defects along the entire length of the ureter. Up in the kidney, there were no filling defects. There were no abnormal contours. Nothing concerning for papillary mass or blunting of calyces or hydronephrosis. Next, I elected just to remove the open-ended catheter. My attention was then turned to the bladder. I exchanged my irrigation from saline to water. I flushed the bladder a few times. I then did three biopsies of the erythema on the right sidewall and left bladder neck. These areas were then cauterized and fulgurated with Bugbee electrocautery where all the bleeding was. At end of the case, there was no significant bleeding. The bladder was filled and released, and filled and released, and there were no abnormalities otherwise or significant bleeding or areas of injury. At this point, the bladder was emptied and the scope was removed. The patient was taken out of lithotomy position, revived from anesthesia, taken to Recovery in stable condition.       Lanny Mariano MD      [id number]  D:  10/27/2020 12:21  T:  10/27/2020 12:59  JOB #:  [id number]

## 2020-10-27 NOTE — PERIOP NOTES
Reviewed PTA medication list with patient/caregiver and patient/caregiver denies any additional medications. Patient admits to having a responsible adult care for them at home for at least 24 hours after surgery. Patient encouraged to use gown warming system and informed that using said warming gown to regulate body temperature prior to a procedure has been shown to help reduce the risks of blood clots and infection. Patient's pharmacy of choice verified and documented in PTA medication section. Dual skin assessment & fall risk band verification completed with Heath Bence RN RN.

## 2020-10-27 NOTE — DISCHARGE INSTRUCTIONS
Please follow up with Dr Glen Javier discharge instructions    Cystoscopy: What to Expect at [address]     A cystoscopy is a procedure that lets a doctor look inside of the bladder and the urethra. The urethra is the tube that carries urine from the bladder to outside the body. The doctor uses a thin, lighted tool called a cystoscope. Your bladder is filled with fluid. This stretches the bladder so that your doctor can look closely at the inside of your bladder. After the cystoscopy, your urethra may be sore at first, and it may burn when you urinate for the first few days after the procedure. You may feel the need to urinate more often, and your urine may be pink. These symptoms should get better in 1 or 2 days. You will probably be able to go back to most of your usual activities in 1 or 2 days. This care sheet gives you a general idea about how long it will take for you to recover. But each person recovers at a different pace. Follow the steps below to get better as quickly as possible. How can you care for yourself at home? Activity    · Rest when you feel tired. Getting enough sleep will help you recover.     · Try to walk each day. Start by walking a little more than you did the day before. Bit by bit, increase the amount you walk. Walking boosts blood flow and helps prevent pneumonia and constipation.     · Avoid strenuous activities, such as bicycle riding, jogging, weight lifting, or aerobic exercise, until your doctor says it is okay.     · Ask your doctor when you can drive again.     · Most people are able to return to work within 1 or 2 days after the procedure.     · You may shower and take baths as usual.     · Ask your doctor when it is okay for you to have sex. Diet    · You can eat your normal diet. If your stomach is upset, try bland, low-fat foods like plain rice, broiled chicken, toast, and yogurt.     · Drink plenty of fluids (unless your doctor tells you not to).    Medicines    · Take pain medicines exactly as directed. ? If the doctor gave you a prescription medicine for pain, take it as prescribed. ? If you are not taking a prescription pain medicine, ask your doctor if you can take an over-the-counter medicine.     · If you think your pain medicine is making you sick to your stomach:  ? Take your medicine after meals (unless your doctor has told you not to). ? Ask your doctor for a different pain medicine.     · If your doctor prescribed antibiotics, take them as directed. Do not stop taking them just because you feel better. You need to take the full course of antibiotics. Follow-up care is a key part of your treatment and safety. Be sure to make and go to all appointments, and call your doctor if you are having problems. It's also a good idea to know your test results and keep a list of the medicines you take. When should you call for help? Call 911 anytime you think you may need emergency care. For example, call if:    · You passed out (lost consciousness).     · You have severe trouble breathing.     · You have sudden chest pain and shortness of breath, or you cough up blood.     · You have severe belly pain. Call your doctor now or seek immediate medical care if:    · You are sick to your stomach or cannot keep fluids down.     · Your urine is still red or you see blood clots after you have urinated several times.     · You have trouble passing urine or stool, especially if you have pain or swelling in your lower belly.     · You have signs of a blood clot, such as:  ? Pain in your calf, back of the knee, thigh, or groin. ? Redness and swelling in your leg or groin.     · You develop a fever or severe chills.     · You have pain in your back just below your rib cage. This is called flank pain. Watch closely for changes in your health, and be sure to contact your doctor if:    · You have pain or burning when you urinate.  A burning feeling is normal for a day or two after the test, but call if it does not get better.     · You have a frequent urge to urinate but can pass only small amounts of urine.     · Your urine is pink, red, or cloudy, or smells bad. It is normal for the urine to have a pinkish color for a few days after the test, but call if it does not get better. Where can you learn more? Go to http://www.gray.com/  Enter C842 in the search box to learn more about \"Cystoscopy: What to Expect at Home. \"  Current as of: June 29, 2020               Content Version: 12.6  © 0508-8890 [employer]. Care instructions adapted under license by [employer] (which disclaims liability or warranty for this information). If you have questions about a medical condition or this instruction, always ask your healthcare professional. [address] any warranty or liability for your use of this information. DISCHARGE SUMMARY from Nurse    PATIENT INSTRUCTIONS:    After general anesthesia or intravenous sedation, for 24 hours or while taking prescription Narcotics:  · Limit your activities  · Do not drive and operate hazardous machinery  · Do not make important personal or business decisions  · Do  not drink alcoholic beverages  · If you have not urinated within 8 hours after discharge, please contact your surgeon on call.     Report the following to your surgeon:  · Excessive pain, swelling, redness or odor of or around the surgical area  · Temperature over 100.5  · Nausea and vomiting lasting longer than 4 hours or if unable to take medications  · Any signs of decreased circulation or nerve impairment to extremity: change in color, persistent  numbness, tingling, coldness or increase pain  · Any questions    What to do at Home:  Recommended activity: Activity as tolerated and no driving for today and Ambulate in house,     If you experience any of the following symptoms chills, fever, unable to void, pain that is not going away with pain medication, nausea & vomiting that you can not hold down fluid, please follow up with Dr Delvin Beebe. *  Please give a list of your current medications to your Primary Care Provider. *  Please update this list whenever your medications are discontinued, doses are      changed, or new medications (including over-the-counter products) are added. *  Please carry medication information at all times in case of emergency situations. These are general instructions for a healthy lifestyle:    No smoking/ No tobacco products/ Avoid exposure to second hand smoke  Surgeon General's Warning:  Quitting smoking now greatly reduces serious risk to your health. Obesity, smoking, and sedentary lifestyle greatly increases your risk for illness    A healthy diet, regular physical exercise & weight monitoring are important for maintaining a healthy lifestyle    You may be retaining fluid if you have a history of heart failure or if you experience any of the following symptoms:  Weight gain of 3 pounds or more overnight or 5 pounds in a week, increased swelling in our hands or feet or shortness of breath while lying flat in bed. Please call your doctor as soon as you notice any of these symptoms; do not wait until your next office visit. The discharge information has been reviewed with the patient and guardian. The patient and guardian verbalized understanding. Discharge medications reviewed with the patient and caregiver and appropriate educational materials and side effects teaching were provided.   Patient armband removed and shredded  ___________________________________________________________________________________________________________________________________

## 2020-10-27 NOTE — BRIEF OP NOTE
Brief Postoperative Note    Patient: Stefany Petit  YOB: 1950  MRN: [id number]    Date of Procedure: 10/27/2020     Pre-Op Diagnosis: BLADDER NECK CANCER,  ABNORMAL URINE CYTOLOGY    Post-Op Diagnosis: Same as preoperative diagnosis.       Procedure(s):  CYSTOSCOPY, SELECTIVE CYTOLOGY, BILATERAL RETROGRADE PYELOGRAM WITH INTERPRETATION, BLADDER BIOPSY AND FULGURATION W/C-ARM    Surgeon(s):  Simpson Apgar, MD    Surgical Assistant: None    Anesthesia: General     Estimated Blood Loss (mL): Minimal    Complications: None    Specimens:   ID Type Source Tests Collected by Time Destination   1 : BLADDER BIOPSY Preservative Bladder  Simpson Apgar, MD 10/27/2020 1131 Pathology   1 : Diane Metz  - PERFORM REFLEX FISH Fresh Bladder Wash  Simpson Apgar, MD 10/27/2020 1131 Cytology   2 : RIGHT-SIDED WASHINGS Fresh URETER, RIGHT  Simpson Apgar, MD 10/27/2020 1142 Cytology   3 : LEFT-SIDED WASHINGS Fresh URETER, LEFT  Simpson Apgar, MD 10/27/2020 1151 Cytology        Implants: * No implants in log *    Drains: * No LDAs found *    Findings: DIFFUSE PATCHY BLADDER ERYTHEMA - ESPECIALLY RIGHT SIDE WALL AND LEFT BLADDER NECK,  STONE IN RIGHT RENAL PELVIS, OTHERWISE NORMAL RPG'S    Electronically Signed by Yasmin Ryder MD on 10/27/2020 at 12:08 PM

## 2021-01-14 ENCOUNTER — HOSPITAL ENCOUNTER (OUTPATIENT)
Dept: PREADMISSION TESTING | Age: 71
Discharge: HOME | End: 2021-01-14
Payer: MEDICARE

## 2021-01-14 LAB
ANION GAP SERPL CALC-SCNC: 0 MMOL/L (ref 3–18)
ATRIAL RATE: 67 BPM
BUN SERPL-MCNC: 24 MG/DL (ref 7–18)
BUN/CREAT SERPL: 23 (ref 12–20)
CALCIUM SERPL-MCNC: 8.7 MG/DL (ref 8.5–10.1)
CALCULATED P AXIS, ECG09: 76 DEGREES
CALCULATED R AXIS, ECG10: 31 DEGREES
CALCULATED T AXIS, ECG11: 70 DEGREES
CHLORIDE SERPL-SCNC: 110 MMOL/L (ref 100–111)
CO2 SERPL-SCNC: 31 MMOL/L (ref 21–32)
CREAT SERPL-MCNC: 1.04 MG/DL (ref 0.6–1.3)
DIAGNOSIS, 93000: NORMAL
GLUCOSE SERPL-MCNC: 115 MG/DL (ref 74–99)
HCT VFR BLD AUTO: 46.9 % (ref 35–45)
HGB BLD-MCNC: 15.1 G/DL (ref 12–16)
P-R INTERVAL, ECG05: 148 MS
POTASSIUM SERPL-SCNC: 4.8 MMOL/L (ref 3.5–5.5)
Q-T INTERVAL, ECG07: 410 MS
QRS DURATION, ECG06: 94 MS
QTC CALCULATION (BEZET), ECG08: 433 MS
SODIUM SERPL-SCNC: 141 MMOL/L (ref 136–145)
VENTRICULAR RATE, ECG03: 67 BPM

## 2021-01-14 PROCEDURE — 36415 COLL VENOUS BLD VENIPUNCTURE: CPT

## 2021-01-14 PROCEDURE — 80048 BASIC METABOLIC PNL TOTAL CA: CPT

## 2021-01-14 PROCEDURE — 85018 HEMOGLOBIN: CPT

## 2021-01-14 PROCEDURE — 93005 ELECTROCARDIOGRAM TRACING: CPT

## 2021-01-27 ENCOUNTER — HOSPITAL ENCOUNTER (OUTPATIENT)
Dept: PREADMISSION TESTING | Age: 71
Discharge: HOME | End: 2021-01-27
Payer: MEDICARE

## 2021-01-27 PROCEDURE — U0003 INFECTIOUS AGENT DETECTION BY NUCLEIC ACID (DNA OR RNA); SEVERE ACUTE RESPIRATORY SYNDROME CORONAVIRUS 2 (SARS-COV-2) (CORONAVIRUS DISEASE [COVID-19]), AMPLIFIED PROBE TECHNIQUE, MAKING USE OF HIGH THROUGHPUT TECHNOLOGIES AS DESCRIBED BY CMS-2020-01-R: HCPCS

## 2021-01-28 LAB — SARS-COV-2, COV2NT: NOT DETECTED

## 2021-02-01 ENCOUNTER — ANESTHESIA EVENT (OUTPATIENT)
Dept: SURGERY | Age: 71
End: 2021-02-01
Payer: MEDICARE

## 2021-02-01 RX ORDER — SODIUM CHLORIDE 0.9 % (FLUSH) 0.9 %
5-40 SYRINGE (ML) INJECTION AS NEEDED
Status: CANCELLED | OUTPATIENT
Start: 2021-02-01

## 2021-02-01 RX ORDER — FENTANYL CITRATE 50 UG/ML
25 INJECTION, SOLUTION INTRAMUSCULAR; INTRAVENOUS AS NEEDED
Status: CANCELLED | OUTPATIENT
Start: 2021-02-01

## 2021-02-01 RX ORDER — SODIUM CHLORIDE, SODIUM LACTATE, POTASSIUM CHLORIDE, CALCIUM CHLORIDE 600; 310; 30; 20 MG/100ML; MG/100ML; MG/100ML; MG/100ML
1000 INJECTION, SOLUTION INTRAVENOUS CONTINUOUS
Status: CANCELLED | OUTPATIENT
Start: 2021-02-01

## 2021-02-01 RX ORDER — SODIUM CHLORIDE 0.9 % (FLUSH) 0.9 %
5-40 SYRINGE (ML) INJECTION EVERY 8 HOURS
Status: CANCELLED | OUTPATIENT
Start: 2021-02-01

## 2021-02-01 RX ORDER — MAGNESIUM SULFATE 100 %
4 CRYSTALS MISCELLANEOUS AS NEEDED
Status: CANCELLED | OUTPATIENT
Start: 2021-02-01

## 2021-02-01 RX ORDER — FLUMAZENIL 0.1 MG/ML
0.2 INJECTION INTRAVENOUS
Status: CANCELLED | OUTPATIENT
Start: 2021-02-01

## 2021-02-01 RX ORDER — NALOXONE HYDROCHLORIDE 0.4 MG/ML
0.1 INJECTION, SOLUTION INTRAMUSCULAR; INTRAVENOUS; SUBCUTANEOUS AS NEEDED
Status: CANCELLED | OUTPATIENT
Start: 2021-02-01

## 2021-02-01 RX ORDER — HYDROMORPHONE HYDROCHLORIDE 2 MG/ML
0.5 INJECTION, SOLUTION INTRAMUSCULAR; INTRAVENOUS; SUBCUTANEOUS
Status: CANCELLED | OUTPATIENT
Start: 2021-02-01

## 2021-02-02 ENCOUNTER — HOSPITAL ENCOUNTER (OUTPATIENT)
Age: 71
Setting detail: OUTPATIENT SURGERY
Discharge: HOME | End: 2021-02-02
Attending: UROLOGY | Admitting: UROLOGY
Payer: MEDICARE

## 2021-02-02 ENCOUNTER — APPOINTMENT (OUTPATIENT)
Dept: GENERAL RADIOLOGY | Age: 71
End: 2021-02-02
Attending: UROLOGY
Payer: MEDICARE

## 2021-02-02 ENCOUNTER — ANESTHESIA (OUTPATIENT)
Dept: SURGERY | Age: 71
End: 2021-02-02
Payer: MEDICARE

## 2021-02-02 VITALS
HEIGHT: 64 IN | OXYGEN SATURATION: 100 % | BODY MASS INDEX: 48.32 KG/M2 | RESPIRATION RATE: 20 BRPM | HEART RATE: 85 BPM | WEIGHT: 283 LBS | DIASTOLIC BLOOD PRESSURE: 83 MMHG | TEMPERATURE: 98.3 F | SYSTOLIC BLOOD PRESSURE: 149 MMHG

## 2021-02-02 PROCEDURE — 96361 HYDRATE IV INFUSION ADD-ON: CPT

## 2021-02-02 PROCEDURE — 96360 HYDRATION IV INFUSION INIT: CPT

## 2021-02-02 PROCEDURE — 74011250636 HC RX REV CODE- 250/636: Performed by: UROLOGY

## 2021-02-02 RX ORDER — LEVOFLOXACIN 5 MG/ML
500 INJECTION, SOLUTION INTRAVENOUS ONCE
Status: DISCONTINUED | OUTPATIENT
Start: 2021-02-02 | End: 2021-02-02 | Stop reason: HOSPADM

## 2021-02-02 RX ORDER — SODIUM CHLORIDE, SODIUM LACTATE, POTASSIUM CHLORIDE, CALCIUM CHLORIDE 600; 310; 30; 20 MG/100ML; MG/100ML; MG/100ML; MG/100ML
125 INJECTION, SOLUTION INTRAVENOUS CONTINUOUS
Status: DISCONTINUED | OUTPATIENT
Start: 2021-02-02 | End: 2021-02-02 | Stop reason: HOSPADM

## 2021-02-02 RX ADMIN — SODIUM CHLORIDE, SODIUM LACTATE, POTASSIUM CHLORIDE, AND CALCIUM CHLORIDE 125 ML/HR: 600; 310; 30; 20 INJECTION, SOLUTION INTRAVENOUS at 08:54

## 2021-02-02 NOTE — PERIOP NOTES
Reviewed PTA medication list with patient/caregiver and patient/caregiver denies any additional medications. Patient admits to having a responsible adult care for them at home for at least 24 hours after surgery. Patient encouraged to use gown warming system and informed that using said warming gown to regulate body temperature prior to a procedure has been shown to help reduce the risks of blood clots and infection. Patient's pharmacy of choice verified and documented in PTA medication section. Dual skin assessment & fall risk band verification completed with Ivy Hatfield RN.

## 2021-02-02 NOTE — PROGRESS NOTES
Patient c/o different and new palpitations (warmth in her chest)  last week after her clearance occurred. She brought this up to me and said she called the urology office to let them know. After talking with Dr. Aakash Santo (her pcp) it was discovered that a stress test is to be done. I told the patient this and we feel like she needs to postpone this procedure until all things are checked out with her heart.

## 2021-02-02 NOTE — NURSE NAVIGATOR
Telephone call to PCP's office. Patient complains of dizziness with palpitations after her clearance. Dr. Horace Shannon on phone speaking  with Dr. Janae Junior. Waiting for final decision prior to proceeding.

## 2021-02-10 ENCOUNTER — HOSPITAL ENCOUNTER (OUTPATIENT)
Dept: PREADMISSION TESTING | Age: 71
Discharge: HOME | End: 2021-02-10
Payer: MEDICARE

## 2021-02-10 PROCEDURE — U0003 INFECTIOUS AGENT DETECTION BY NUCLEIC ACID (DNA OR RNA); SEVERE ACUTE RESPIRATORY SYNDROME CORONAVIRUS 2 (SARS-COV-2) (CORONAVIRUS DISEASE [COVID-19]), AMPLIFIED PROBE TECHNIQUE, MAKING USE OF HIGH THROUGHPUT TECHNOLOGIES AS DESCRIBED BY CMS-2020-01-R: HCPCS

## 2021-02-11 LAB — SARS-COV-2, COV2NT: NOT DETECTED

## 2021-02-15 ENCOUNTER — ANESTHESIA EVENT (OUTPATIENT)
Dept: SURGERY | Age: 71
End: 2021-02-15
Payer: MEDICARE

## 2021-02-16 ENCOUNTER — HOSPITAL ENCOUNTER (OUTPATIENT)
Age: 71
Setting detail: OUTPATIENT SURGERY
Discharge: HOME | End: 2021-02-16
Attending: UROLOGY | Admitting: UROLOGY
Payer: MEDICARE

## 2021-02-16 ENCOUNTER — APPOINTMENT (OUTPATIENT)
Dept: GENERAL RADIOLOGY | Age: 71
End: 2021-02-16
Attending: UROLOGY
Payer: MEDICARE

## 2021-02-16 ENCOUNTER — ANESTHESIA (OUTPATIENT)
Dept: SURGERY | Age: 71
End: 2021-02-16
Payer: MEDICARE

## 2021-02-16 VITALS
RESPIRATION RATE: 16 BRPM | OXYGEN SATURATION: 100 % | DIASTOLIC BLOOD PRESSURE: 45 MMHG | TEMPERATURE: 97.3 F | WEIGHT: 282.5 LBS | BODY MASS INDEX: 48.23 KG/M2 | HEIGHT: 64 IN | SYSTOLIC BLOOD PRESSURE: 132 MMHG | HEART RATE: 75 BPM

## 2021-02-16 DIAGNOSIS — C67.5 CANCER OF BLADDER NECK (HCC): Primary | ICD-10-CM

## 2021-02-16 PROCEDURE — 88112 CYTOPATH CELL ENHANCE TECH: CPT

## 2021-02-16 PROCEDURE — 74011250636 HC RX REV CODE- 250/636: Performed by: NURSE ANESTHETIST, CERTIFIED REGISTERED

## 2021-02-16 PROCEDURE — 74011000250 HC RX REV CODE- 250: Performed by: NURSE ANESTHETIST, CERTIFIED REGISTERED

## 2021-02-16 PROCEDURE — 74011250636 HC RX REV CODE- 250/636: Performed by: UROLOGY

## 2021-02-16 PROCEDURE — 76210000063 HC OR PH I REC FIRST 0.5 HR: Performed by: UROLOGY

## 2021-02-16 PROCEDURE — C1758 CATHETER, URETERAL: HCPCS | Performed by: UROLOGY

## 2021-02-16 PROCEDURE — 76210000021 HC REC RM PH II 0.5 TO 1 HR: Performed by: UROLOGY

## 2021-02-16 PROCEDURE — 88120 CYTP URNE 3-5 PROBES EA SPEC: CPT

## 2021-02-16 PROCEDURE — C1726 CATH, BAL DIL, NON-VASCULAR: HCPCS | Performed by: UROLOGY

## 2021-02-16 PROCEDURE — 77030012508 HC MSK AIRWY LMA AMBU -A: Performed by: ANESTHESIOLOGY

## 2021-02-16 PROCEDURE — 76010000149 HC OR TIME 1 TO 1.5 HR: Performed by: UROLOGY

## 2021-02-16 PROCEDURE — 76060000033 HC ANESTHESIA 1 TO 1.5 HR: Performed by: UROLOGY

## 2021-02-16 PROCEDURE — 88121 CYTP URINE 3-5 PROBES CMPTR: CPT

## 2021-02-16 PROCEDURE — 77030020782 HC GWN BAIR PAWS FLX 3M -B: Performed by: UROLOGY

## 2021-02-16 PROCEDURE — 74011000636 HC RX REV CODE- 636: Performed by: UROLOGY

## 2021-02-16 PROCEDURE — 2709999900 HC NON-CHARGEABLE SUPPLY: Performed by: UROLOGY

## 2021-02-16 PROCEDURE — 74420 UROGRAPHY RTRGR +-KUB: CPT

## 2021-02-16 PROCEDURE — C1769 GUIDE WIRE: HCPCS | Performed by: UROLOGY

## 2021-02-16 PROCEDURE — 88305 TISSUE EXAM BY PATHOLOGIST: CPT

## 2021-02-16 PROCEDURE — C2617 STENT, NON-COR, TEM W/O DEL: HCPCS | Performed by: UROLOGY

## 2021-02-16 PROCEDURE — 77030040361 HC SLV COMPR DVT MDII -B: Performed by: UROLOGY

## 2021-02-16 PROCEDURE — 77030014023 HC SYR INFL LVEEN BSC -B: Performed by: UROLOGY

## 2021-02-16 DEVICE — VARIABLE LENGTH URETERAL STENT
Type: IMPLANTABLE DEVICE | Site: URETER | Status: FUNCTIONAL
Brand: CONTOUR VL™

## 2021-02-16 RX ORDER — HYDROCODONE BITARTRATE AND ACETAMINOPHEN 5; 325 MG/1; MG/1
1 TABLET ORAL
Qty: 25 TAB | Refills: 0 | Status: SHIPPED | OUTPATIENT
Start: 2021-02-16 | End: 2021-02-19

## 2021-02-16 RX ORDER — FENTANYL CITRATE 50 UG/ML
INJECTION, SOLUTION INTRAMUSCULAR; INTRAVENOUS AS NEEDED
Status: DISCONTINUED | OUTPATIENT
Start: 2021-02-16 | End: 2021-02-16 | Stop reason: HOSPADM

## 2021-02-16 RX ORDER — LEVOFLOXACIN 500 MG/1
500 TABLET, FILM COATED ORAL DAILY
Qty: 3 TAB | Refills: 0 | Status: SHIPPED | OUTPATIENT
Start: 2021-02-17 | End: 2021-02-20

## 2021-02-16 RX ORDER — NALOXONE HYDROCHLORIDE 0.4 MG/ML
0.1 INJECTION, SOLUTION INTRAMUSCULAR; INTRAVENOUS; SUBCUTANEOUS AS NEEDED
Status: DISCONTINUED | OUTPATIENT
Start: 2021-02-16 | End: 2021-02-16 | Stop reason: HOSPADM

## 2021-02-16 RX ORDER — DEXAMETHASONE SODIUM PHOSPHATE 4 MG/ML
INJECTION, SOLUTION INTRA-ARTICULAR; INTRALESIONAL; INTRAMUSCULAR; INTRAVENOUS; SOFT TISSUE AS NEEDED
Status: DISCONTINUED | OUTPATIENT
Start: 2021-02-16 | End: 2021-02-16 | Stop reason: HOSPADM

## 2021-02-16 RX ORDER — SODIUM CHLORIDE 0.9 % (FLUSH) 0.9 %
5-40 SYRINGE (ML) INJECTION AS NEEDED
Status: DISCONTINUED | OUTPATIENT
Start: 2021-02-16 | End: 2021-02-16 | Stop reason: HOSPADM

## 2021-02-16 RX ORDER — MAGNESIUM SULFATE 100 %
4 CRYSTALS MISCELLANEOUS AS NEEDED
Status: DISCONTINUED | OUTPATIENT
Start: 2021-02-16 | End: 2021-02-16 | Stop reason: HOSPADM

## 2021-02-16 RX ORDER — EPHEDRINE SULFATE/0.9% NACL/PF 50 MG/5 ML
SYRINGE (ML) INTRAVENOUS AS NEEDED
Status: DISCONTINUED | OUTPATIENT
Start: 2021-02-16 | End: 2021-02-16 | Stop reason: HOSPADM

## 2021-02-16 RX ORDER — MIDAZOLAM HYDROCHLORIDE 1 MG/ML
INJECTION, SOLUTION INTRAMUSCULAR; INTRAVENOUS AS NEEDED
Status: DISCONTINUED | OUTPATIENT
Start: 2021-02-16 | End: 2021-02-16 | Stop reason: HOSPADM

## 2021-02-16 RX ORDER — LEVOFLOXACIN 5 MG/ML
500 INJECTION, SOLUTION INTRAVENOUS ONCE
Status: COMPLETED | OUTPATIENT
Start: 2021-02-16 | End: 2021-02-16

## 2021-02-16 RX ORDER — PROPOFOL 10 MG/ML
INJECTION, EMULSION INTRAVENOUS AS NEEDED
Status: DISCONTINUED | OUTPATIENT
Start: 2021-02-16 | End: 2021-02-16 | Stop reason: HOSPADM

## 2021-02-16 RX ORDER — SODIUM CHLORIDE, SODIUM LACTATE, POTASSIUM CHLORIDE, CALCIUM CHLORIDE 600; 310; 30; 20 MG/100ML; MG/100ML; MG/100ML; MG/100ML
125 INJECTION, SOLUTION INTRAVENOUS CONTINUOUS
Status: DISCONTINUED | OUTPATIENT
Start: 2021-02-16 | End: 2021-02-16 | Stop reason: HOSPADM

## 2021-02-16 RX ORDER — HYDROMORPHONE HYDROCHLORIDE 1 MG/ML
0.5 INJECTION, SOLUTION INTRAMUSCULAR; INTRAVENOUS; SUBCUTANEOUS
Status: DISCONTINUED | OUTPATIENT
Start: 2021-02-16 | End: 2021-02-16 | Stop reason: HOSPADM

## 2021-02-16 RX ORDER — LIDOCAINE HYDROCHLORIDE 20 MG/ML
INJECTION, SOLUTION EPIDURAL; INFILTRATION; INTRACAUDAL; PERINEURAL AS NEEDED
Status: DISCONTINUED | OUTPATIENT
Start: 2021-02-16 | End: 2021-02-16 | Stop reason: HOSPADM

## 2021-02-16 RX ORDER — FENTANYL CITRATE 50 UG/ML
25 INJECTION, SOLUTION INTRAMUSCULAR; INTRAVENOUS AS NEEDED
Status: DISCONTINUED | OUTPATIENT
Start: 2021-02-16 | End: 2021-02-16 | Stop reason: HOSPADM

## 2021-02-16 RX ORDER — ONDANSETRON 2 MG/ML
INJECTION INTRAMUSCULAR; INTRAVENOUS AS NEEDED
Status: DISCONTINUED | OUTPATIENT
Start: 2021-02-16 | End: 2021-02-16 | Stop reason: HOSPADM

## 2021-02-16 RX ORDER — FLUMAZENIL 0.1 MG/ML
0.2 INJECTION INTRAVENOUS
Status: DISCONTINUED | OUTPATIENT
Start: 2021-02-16 | End: 2021-02-16 | Stop reason: HOSPADM

## 2021-02-16 RX ORDER — SODIUM CHLORIDE 0.9 % (FLUSH) 0.9 %
5-40 SYRINGE (ML) INJECTION EVERY 8 HOURS
Status: DISCONTINUED | OUTPATIENT
Start: 2021-02-16 | End: 2021-02-16 | Stop reason: HOSPADM

## 2021-02-16 RX ORDER — SODIUM CHLORIDE, SODIUM LACTATE, POTASSIUM CHLORIDE, CALCIUM CHLORIDE 600; 310; 30; 20 MG/100ML; MG/100ML; MG/100ML; MG/100ML
1000 INJECTION, SOLUTION INTRAVENOUS CONTINUOUS
Status: DISCONTINUED | OUTPATIENT
Start: 2021-02-16 | End: 2021-02-16 | Stop reason: HOSPADM

## 2021-02-16 RX ADMIN — DEXAMETHASONE SODIUM PHOSPHATE 8 MG: 4 INJECTION, SOLUTION INTRAMUSCULAR; INTRAVENOUS at 09:51

## 2021-02-16 RX ADMIN — LEVOFLOXACIN 500 MG: 5 INJECTION, SOLUTION INTRAVENOUS at 09:41

## 2021-02-16 RX ADMIN — MIDAZOLAM 2 MG: 1 INJECTION INTRAMUSCULAR; INTRAVENOUS at 09:41

## 2021-02-16 RX ADMIN — PHENYLEPHRINE HYDROCHLORIDE 100 MCG: 10 INJECTION INTRAVENOUS at 10:20

## 2021-02-16 RX ADMIN — Medication 10 MG: at 09:58

## 2021-02-16 RX ADMIN — SODIUM CHLORIDE, SODIUM LACTATE, POTASSIUM CHLORIDE, AND CALCIUM CHLORIDE 125 ML/HR: 600; 310; 30; 20 INJECTION, SOLUTION INTRAVENOUS at 11:02

## 2021-02-16 RX ADMIN — FENTANYL CITRATE 50 MCG: 50 INJECTION, SOLUTION INTRAMUSCULAR; INTRAVENOUS at 09:53

## 2021-02-16 RX ADMIN — ONDANSETRON HYDROCHLORIDE 4 MG: 2 INJECTION INTRAMUSCULAR; INTRAVENOUS at 10:32

## 2021-02-16 RX ADMIN — FENTANYL CITRATE 50 MCG: 50 INJECTION, SOLUTION INTRAMUSCULAR; INTRAVENOUS at 10:31

## 2021-02-16 RX ADMIN — SODIUM CHLORIDE, SODIUM LACTATE, POTASSIUM CHLORIDE, AND CALCIUM CHLORIDE 125 ML/HR: 600; 310; 30; 20 INJECTION, SOLUTION INTRAVENOUS at 11:59

## 2021-02-16 RX ADMIN — LIDOCAINE HYDROCHLORIDE 60 MG: 20 INJECTION, SOLUTION EPIDURAL; INFILTRATION; INTRACAUDAL; PERINEURAL at 09:48

## 2021-02-16 RX ADMIN — SODIUM CHLORIDE, SODIUM LACTATE, POTASSIUM CHLORIDE, AND CALCIUM CHLORIDE 125 ML/HR: 600; 310; 30; 20 INJECTION, SOLUTION INTRAVENOUS at 08:21

## 2021-02-16 RX ADMIN — Medication 10 MG: at 10:02

## 2021-02-16 RX ADMIN — PROPOFOL 190 MG: 10 INJECTION, EMULSION INTRAVENOUS at 09:48

## 2021-02-16 RX ADMIN — SODIUM CHLORIDE, SODIUM LACTATE, POTASSIUM CHLORIDE, AND CALCIUM CHLORIDE: 600; 310; 30; 20 INJECTION, SOLUTION INTRAVENOUS at 10:15

## 2021-02-16 NOTE — ANESTHESIA POSTPROCEDURE EVALUATION
Procedure(s):  CYSTOSCOPY,SELECTIVE CYTOLOGY,BILATERAL RETROGRADE PYELOGRAM WITH INTERPRETATION, LEFT DIAGNOSTIC URETEROSCOPY, BLADDER BIOPSY WITH FULGULRATION W/C-ARM. general    Anesthesia Post Evaluation        Comments: Post-Anesthesia Evaluation and Assessment    Cardiovascular Function/Vital Signs  BP (!) 133/54   Pulse 76   Temp 36.6 °C (97.9 °F)   Resp 17   Ht 5' 4\" (1.626 m)   Wt 128.1 kg (282 lb 8 oz)   SpO2 100%   BMI 48.49 kg/m²     Patient is status post Procedure(s):  CYSTOSCOPY,SELECTIVE CYTOLOGY,BILATERAL RETROGRADE PYELOGRAM WITH INTERPRETATION, LEFT DIAGNOSTIC URETEROSCOPY, BLADDER BIOPSY WITH FULGULRATION W/C-ARM. Nausea/Vomiting: Controlled. Postoperative hydration reviewed and adequate. Pain:  Pain Scale 1: Numeric (0 - 10) (02/16/21 1108)  Pain Intensity 1: 0 (02/16/21 1108)   Managed. Neurological Status:   Neuro (WDL): Within Defined Limits (02/16/21 1108)   At baseline. Mental Status and Level of Consciousness: Arousable. Pulmonary Status:   O2 Device: Room air (02/16/21 1110)   Adequate oxygenation and airway patent. Complications related to anesthesia: None    Post-anesthesia assessment completed. No concerns. Patient has met all discharge requirements. Signed By: Marcela Brandt MD    February 16, 2021                   INITIAL Post-op Vital signs:   Vitals Value Taken Time   /54 02/16/21 1105   Temp 36.6 °C (97.9 °F) 02/16/21 1048   Pulse 77 02/16/21 1108   Resp 15 02/16/21 1108   SpO2 98 % 02/16/21 1108   Vitals shown include unvalidated device data.

## 2021-02-16 NOTE — BRIEF OP NOTE
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 Procedure(s):  CYSTOSCOPY,SELECTIVE CYTOLOGY,BILATERAL RETROGRADE PYELOGRAM WITH INTERPRETATION, LEFT DIAGNOSTIC URETEROSCOPY, BLADDER BIOPSY WITH FULGULRATION W/C-ARM    Surgeon(s):  Michelle Goldberg MD    Surgical Assistant: NONE    Anesthesia: General     Estimated Blood Loss (mL): Minimal    Complications: None    Specimens:   ID Type Source Tests Collected by Time Destination   1 : BLADDER BIOPSIES Preservative Bladder  Michelle Goldberg MD 2/16/2021 1036 Pathology   1 : BLADDER WASHING Fresh Bladder Wash  Michelle Goldberg MD 2/16/2021 1002 Cytology   2 : LEFT SIDED WASHING Fresh Ureter Left  Michelle Goldberg MD 2/16/2021 1003 Cytology   3 : RIGHT SIDED WASHING Fresh URETER, RIGHT  Michelle Goldberg MD 2/16/2021 1003 Cytology        Implants:   Implant Name Type Inv. Item Serial No.  Lot No. LRB No. Used Action   STENT URET 4.8FR H70-16DO PERCFLX HYDR+ SFT PGTL TAPR TIP - [id number]  STENT URET 4.8FR W36-12SD PERCFLX HYDR+ SFT PGTL TAPR TIP  [employer]_ [id number] Left 1 Implanted       Drains: * No LDAs found *    Findings: BLADDER HAD SOME ERYTHEMA ON THE LEFT BLADDER NECK AND POSTERIORLY TOWARD THE DOME. NORMAL RIGHT RETROGRADE PYELOGRAM EXCEPT SOMEWHAT DISTEND RENAL PELVIS AND THE KNOWN LARGE STONE.  LEFT URETER WAS NORMAL ON RPG AND THE UPPER AND MIDPOLE CALYCES WERE NORMAL, BUT THE LOWER POLE WAS NOT FILLING OUT EASILY. THE UPJ WAS VERY TIGHT AND I COULD NOT PASS MY URETEROSCOPE INTO THE KINDEY.   A STENT WAS PLACED    Electronically Signed by Martell Valdez MD on 2/16/2021 at 10:54 AM

## 2021-02-16 NOTE — OP NOTES
[facility]    Name:  Isidro Vance  MR#:   [id number]  :  1950  ACCOUNT #:  [de-identified]  DATE OF SERVICE:  2021    PREOPERATIVE DIAGNOSES:  Carcinoma in situ of the bladder and abnormal urine cytology. POSTOPERATIVE DIAGNOSES:  Carcinoma in situ of the bladder and abnormal urine cytology. PROCEDURE PERFORMED:  Cystoscopy, selective cytologies, bilateral retrograde pyelograms with interpretation, left diagnostic ureteroscopy, bladder biopsy and fulguration. SURGEON:  Martell Valdez MD    ASSISTANT:  None. ANESTHESIA:  General.    COMPLICATIONS:  None. SPECIMENS REMOVED:  Bladder biopsies, bladder washing, left upper tract washing and right upper tract washing. IMPLANTS:  A 4.8-Israeli variable-length stent. DRAINS:  None. ESTIMATED BLOOD LOSS:  Minimal.    FINDINGS:  The patient had a bladder that had some erythema on the left aspect of the bladder neck and posteriorly towards the dome. Both of these areas were biopsied as part of the bladder biopsies. The retrograde pyelogram on the right was normal except for a somewhat distended renal pelvis and the known large stone extending into the lower pole. The left ureter was normal on retrograde pyelogram, and the upper and midpole calyces were normal but the lower pole was just not easily filling out. I tried to do a left-sided ureteroscopy and I was able to get my scope all the way up in towards the proximal ureter, but in the proximal aspect of the ureter and UPJ, the ureter was just too tight and I could not pass my scope into the kidney. Thus, a stent was placed. INDICATIONS:  The patient is a 65-year-old female with a history of gross hematuria and found to have bladder cancer, who was later found to have carcinoma in situ, who has undergone BCG therapy. She presents for selective cytologies and bladder biopsies.   She has been found on previous selective cytologies to have abnormal FISH coming from both upper tracts. PROCEDURE:  Preoperatively, the risks and benefits of the surgery were described to the patient. The risks included but were not limited to bleeding, infection, injury to the bladder, injury to the ureter, injury to the kidney, possible need for future procedure. The patient understood the risks and signed the informed consent. The patient was taken to the operating room and placed on OR table in supine position. She was administered general anesthetic. She was administered intravenous antibiotics. She was placed in dorsal lithotomy position and prepped and draped in the usual sterile manner. A 22-Faroese cystoscope and sheath were then inserted transurethrally atraumatically under direct vision using a 30-degree lens. Panendoscopy was done. The bilateral ureteral orifices were in normal anatomic position. There were no stones, no tumors, no areas of obvious cancer in the bladder. The bladder was emptied and the bladder was washed, and the washing was sent for bladder cytology. There was noted to be an area of erythema on the posterior bladder wall heading towards the dome and on the left side of the bladder neck. Where her previous tumor was on the right bladder neck was completely normal.    Next, I cannulated the left ureteral orifice with a 5-Faroese open-ended ureteral catheter and sent the catheter up towards the kidney. Urine was collected and a barbotage was done with normal saline to get selective cytology from the left upper tract and all down the left ureter. Next, I did a retrograde pyelogram.  The left ureter was completely normal without any filling defects and there was no hydronephrosis. There was no blunting of calyces and there were no filling defects up in the kidney except that I could not completely distend and demonstrate the lower pole of the kidney no matter how much dye I injected.   Thus, I sent a sensor wire up through the open-ended catheter up to the kidney. The sensor wire was removed. The scope was removed. I then passed a dual-lumen catheter and passed a second sensor up to the kidney. The dual-lumen catheter was removed. One wire was a safety wire and the other wire was a working wire. Over the working wire, I tried passing a flexible ureteroscope but I was unable to do so. I then used an UroMax balloon and dilated the UO to 15-South Korean. The balloon was removed, and then I easily passed the scope up through the distal ureter and mid ureter up to the proximal ureter. As I got closer to the UPJ, the ureter was just too tight and I could not pass the flexible ureteroscope any further. I tried multiple times and different twisting of the scope and was unable to do so. Thus, I slowly backed the scope down the ureter. The ureter was intact and unharmed. I then reinserted the cystoscope. Over the safety wire, I passed a 4.8-[place] Korean variable-length stent. The wire was removed. Fluoroscopy confirmed the proximal coil was within the kidney. The distal coil was noted to be in the bladder by direct vision. At this point, using a fresh open-ended catheter, I cannulated the right ureteral orifice and sent the open-ended catheter up to the kidney. Urine was collected from the right kidney and ureter, and a barbotage was done as well. This was sent off for right upper tract urine for washings. Next, retrograde pyelogram was done on the right side. This was done with 12 mL of Visipaque dye. There were no filling defects along the entire length of the right ureter. There was no hydroureter. There was fullness chronically in the right renal pelvis without blunting of calyces, and the known stone extending towards the lower pole was visualized. At this point, the open-ended catheter was removed. Then, using cup biopsy forceps, I did biopsies of the erythema on the left side of the bladder neck and on the posterior wall towards the dome. All bleeding was cauterized with Bugbee electrocautery. Bilateral ureteral orifices were unharmed. At this point, the bladder was emptied. The patient was then taken out of lithotomy position, revived from anesthesia, taken to Recovery in stable condition.       Israel Cervantes MD      [id number]  D:  02/16/2021 11:04  T:  02/16/2021 12:06  JOB #:  [id number]

## 2021-02-16 NOTE — PERIOP NOTES
Discharge instructions completed via phone call to Lino Holt - opportunity for questions - AVS med list reviewed for duplicates

## 2021-02-16 NOTE — H&P
History and Physical    Subjective:      Leif Bergeron is a 78 y.o. female with a h/o bladder cancer. She has been treated with BCG. She has also had POSITIVE urine cytologies. She presents for bladder biopsies and selective cytology evaluation. No hematuria or dysuria. She had some chest discomfort but was cleared with a negative stress test.   Some SOB with lying down, but not exertion    Past Medical History:   Diagnosis Date    Cancer ([facility] [address] 2019    bladder, chemo  instilled in bladder    High cholesterol     Hypertension 2009    Morbid obesity ([facility] [address]     Stroke ([facility] [address] 2009    TIA, no residual effects    Uterus cancer ([facility] [address] 2010     Past Surgical History:   Procedure Laterality Date    HX APPENDECTOMY  2010    HX DILATION AND CURETTAGE      X 2    HX HYSTERECTOMY  10/2010    BSO    HX OTHER SURGICAL Right     eye- chalazion    HX TONSILLECTOMY      HX UROLOGICAL  12/31/2019    cysto, TURBt      History reviewed. No pertinent family history. Social History     Tobacco Use    Smoking status: Never Smoker    Smokeless tobacco: Never Used   Substance Use Topics    Alcohol use: Yes     Comment: Rarely     Allergies   Allergen Reactions    Pcn [Penicillins] Rash, Itching and Swelling     1971     Prior to Admission medications    Medication Sig Start Date End Date Taking? Authorizing Provider   simvastatin (ZOCOR) 40 mg tablet Take 40 mg by mouth nightly. Indications: high cholesterol   Yes Provider, Historical   clopidogreL (Plavix) 75 mg tab Take 75 mg by mouth daily. Provider, Historical   lisinopril (PRINIVIL, ZESTRIL) 10 mg tablet Take 20 mg by mouth daily. Indications: high blood pressure    Provider, Historical        Review of Systems:  A comprehensive review of systems was negative except for that written in the HPI.      Objective:        Patient Vitals for the past 8 hrs:   BP Temp Pulse Resp SpO2 Height Weight   02/16/21 0739 (!) 141/53 97.5 °F (36.4 °C) 70 16 100 % 5' 4\" (1.626 m) 128.1 kg (282 lb 8 oz)       Temp (24hrs), Av.5 °F (36.4 °C), Min:97.5 °F (36.4 °C), Max:97.5 °F (36.4 °C)      Physical Exam:  NAD  RRR  Breathing easy  Abd - soft, nd, nt  Ext - no edema      Assessment:     Abnromal urine cytology and bladder cancer of overlapping sites    Plan:     1. The risks, benefits, complications, treatment options, and expected outcomes were discussed with the patient. The patient understands the risks, any and all questions were answered to the patient's satisfaction.   2. Proceed with outpatient cystoscopy, selective urine cytologies, bladder biopsies,

## 2021-02-16 NOTE — ANESTHESIA PREPROCEDURE EVALUATION
Relevant Problems   No relevant active problems       Anesthetic History   No history of anesthetic complications            Review of Systems / Medical History  Patient summary reviewed, nursing notes reviewed and pertinent labs reviewed    Pulmonary                   Neuro/Psych       CVA  TIA    Comments: No residual Cardiovascular    Hypertension          Hyperlipidemia      Comments: Pt recently had stress test for palpitations did well   GI/Hepatic/Renal         Renal disease    Pertinent negatives: No GERD   Endo/Other        Morbid obesity  Pertinent negatives: No diabetes, hypothyroidism and hyperthyroidism   Other Findings              Physical Exam    Airway  Mallampati: II  TM Distance: 4 - 6 cm  Neck ROM: normal range of motion        Cardiovascular    Rhythm: regular  Rate: normal         Dental  No notable dental hx       Pulmonary  Breath sounds clear to auscultation               Abdominal  GI exam deferred       Other Findings            Anesthetic Plan    ASA: 3  Anesthesia type: general          Induction: Intravenous  Anesthetic plan and risks discussed with: Patient

## 2021-02-16 NOTE — PERIOP NOTES
DC in wheelchair with CNA, all belongings returned.     Patient armband removed and shredded.

## 2021-02-16 NOTE — PERIOP NOTES
Reviewed PTA medication list with patient/caregiver and patient/caregiver denies any additional medications. Patient admits to having a responsible adult care for them at home for at least 24 hours after surgery. Patient encouraged to use gown warming system and informed that using said warming gown to regulate body temperature prior to a procedure has been shown to help reduce the risks of blood clots and infection. Patient's pharmacy of choice verified and documented in PTA medication section. Dual skin assessment & fall risk band verification completed with Gracy Aaron RN.

## 2021-02-16 NOTE — DISCHARGE INSTRUCTIONS
RESTART ELIQUIS ON Saturday    Resume pre hospital diet  Drink plenty of fluids to keep the urine clear  Take prescription as directed  Ambulate in house  Shower tomorrow  Dr. Jackson's office will call with follow up appointment  Contact Dr. Jackson's office with any Post op questions or concerns at [phone]          DISCHARGE SUMMARY from Nurse    PATIENT INSTRUCTIONS:    After general anesthesia or intravenous sedation, for 24 hours or while taking prescription Narcotics:  · Limit your activities  · Do not drive and operate hazardous machinery  · Do not make important personal or business decisions  · Do  not drink alcoholic beverages  · If you have not urinated within 8 hours after discharge, please contact your surgeon on call.    Report the following to your surgeon:  · Excessive pain, swelling, redness or odor of or around the surgical area  · Temperature over 100.5  · Nausea and vomiting lasting longer than 4 hours or if unable to take medications  · Any signs of decreased circulation or nerve impairment to extremity: change in color, persistent  numbness, tingling, coldness or increase pain  · Any questions    What to do at Home:  Recommended activity: Ambulate in house and No driving while on analgesics,     If you experience any of the following symptoms fever, chills, uncontrollable pain , active bleeding ( thick bloody urine ), nausea, vomiting, please follow up with Dr. Jackson.    *  Please give a list of your current medications to your Primary Care Provider.    *  Please update this list whenever your medications are discontinued, doses are      changed, or new medications (including over-the-counter products) are added.    *  Please carry medication information at all times in case of emergency situations.    These are general instructions for a healthy lifestyle:    No smoking/ No tobacco products/ Avoid exposure to second hand smoke  Surgeon General's Warning:  Quitting smoking now greatly reduces  serious risk to your health. Obesity, smoking, and sedentary lifestyle greatly increases your risk for illness    A healthy diet, regular physical exercise & weight monitoring are important for maintaining a healthy lifestyle    You may be retaining fluid if you have a history of heart failure or if you experience any of the following symptoms:  Weight gain of 3 pounds or more overnight or 5 pounds in a week, increased swelling in our hands or feet or shortness of breath while lying flat in bed. Please call your doctor as soon as you notice any of these symptoms; do not wait until your next office visit. Patient armband removed and shredded    The discharge information has been reviewed with the patient and caregiver. The patient and caregiver verbalized understanding. Discharge medications reviewed with the patient and caregiver and appropriate educational materials and side effects teaching were provided. Learning About Coronavirus [phone])  Coronavirus [phone]): Overview  What is coronavirus (COVID-19)? The coronavirus disease (COVID-19) is caused by a virus. It is an illness that was first found in [place], [place], in December 2019. It has since spread worldwide. The virus can cause fever, cough, and trouble breathing. In severe cases, it can cause pneumonia and make it hard to breathe without help. It can cause death. Coronaviruses are a large group of viruses. They cause the common cold. They also cause more serious illnesses like [place] respiratory syndrome (MERS) and severe acute respiratory syndrome (SARS). COVID-19 is caused by a novel coronavirus. That means it's a new type that has not been seen in people before. This virus spreads person-to-person through droplets from coughing and sneezing. It can also spread when you are close to someone who is infected. And it can spread when you touch something that has the virus on it, such as a doorknob or a tabletop.   What can you do to protect yourself from coronavirus (COVID-19)? The best way to protect yourself from getting sick is to:  · Avoid areas where there is an outbreak. · Avoid contact with people who may be infected. · Wash your hands often with soap or alcohol-based hand sanitizers. · Avoid crowds and try to stay at least 6 feet away from other people. · Wash your hands often, especially after you cough or sneeze. Use soap and water, and scrub for at least 20 seconds. If soap and water aren't available, use an alcohol-based hand . · Avoid touching your mouth, nose, and eyes. What can you do to avoid spreading the virus to others? To help avoid spreading the virus to others:  · Cover your mouth with a tissue when you cough or sneeze. Then throw the tissue in the trash. · Use a disinfectant to clean things that you touch often. · Stay home if you are sick or have been exposed to the virus. Don't go to school, work, or public areas. And don't use public transportation. · If you are sick:  ? Leave your home only if you need to get medical care. But call the doctor's office first so they know you're coming. And wear a face mask, if you have one.  ? If you have a face mask, wear it whenever you're around other people. It can help stop the spread of the virus when you cough or sneeze. ? Clean and disinfect your home every day. Use household  and disinfectant wipes or sprays. Take special care to clean things that you grab with your hands. These include doorknobs, remote controls, phones, and handles on your refrigerator and microwave. And don't forget countertops, tabletops, bathrooms, and computer keyboards. When to call for help  Call 911 anytime you think you may need emergency care. For example, call if:  · You have severe trouble breathing. (You can't talk at all.)  · You have constant chest pain or pressure. · You are severely dizzy or lightheaded. · You are confused or can't think clearly.   · Your face and lips have a blue color. · You pass out (lose consciousness) or are very hard to wake up. Call your doctor now if you develop symptoms such as:  · Shortness of breath. · Fever. · Cough. If you need to get care, call ahead to the doctor's office for instructions before you go. Make sure you wear a face mask, if you have one, to prevent exposing other people to the virus. Where can you get the latest information? The following health organizations are tracking and studying this virus. Their websites contain the most up-to-date information. Curtis Gonzáles also learn what to do if you think you may have been exposed to the virus. · U.S. Centers for Disease Control and Prevention (CDC): The CDC provides updated news about the disease and travel advice. The website also tells you how to prevent the spread of infection. www.cdc.gov  · World Health Organization [facility]): WHO offers information about the virus outbreaks. WHO also has travel advice. www.who.int  Current as of: April 1, 2020               Content Version: 12.4  © 2006-2020 [employer]. Care instructions adapted under license by your healthcare professional. If you have questions about a medical condition or this instruction, always ask your healthcare professional. [address] any warranty or liability for your use of this information.     ___________________________________________________________________________________________________________________________________

## 2021-05-18 ENCOUNTER — TRANSCRIBE ORDER (OUTPATIENT)
Dept: REGISTRATION | Age: 71
End: 2021-05-18

## 2021-05-18 ENCOUNTER — HOSPITAL ENCOUNTER (OUTPATIENT)
Dept: PREADMISSION TESTING | Age: 71
Discharge: HOME | End: 2021-05-18
Payer: MEDICARE

## 2021-05-18 DIAGNOSIS — D09.0 CARCINOMA IN SITU OF BLADDER: Primary | ICD-10-CM

## 2021-05-18 DIAGNOSIS — D09.0 CARCINOMA IN SITU OF BLADDER: ICD-10-CM

## 2021-05-18 LAB
ANION GAP SERPL CALC-SCNC: 4 MMOL/L (ref 3–18)
BASOPHILS # BLD: 0 K/UL (ref 0–0.1)
BASOPHILS NFR BLD: 0 % (ref 0–2)
BUN SERPL-MCNC: 18 MG/DL (ref 7–18)
BUN/CREAT SERPL: 18 (ref 12–20)
CALCIUM SERPL-MCNC: 8.5 MG/DL (ref 8.5–10.1)
CHLORIDE SERPL-SCNC: 109 MMOL/L (ref 100–111)
CO2 SERPL-SCNC: 29 MMOL/L (ref 21–32)
CREAT SERPL-MCNC: 1.01 MG/DL (ref 0.6–1.3)
DIFFERENTIAL METHOD BLD: ABNORMAL
EOSINOPHIL # BLD: 0.2 K/UL (ref 0–0.4)
EOSINOPHIL NFR BLD: 3 % (ref 0–5)
ERYTHROCYTE [DISTWIDTH] IN BLOOD BY AUTOMATED COUNT: 12.6 % (ref 11.6–14.5)
GLUCOSE SERPL-MCNC: 96 MG/DL (ref 74–99)
HCT VFR BLD AUTO: 47 % (ref 35–45)
HGB BLD-MCNC: 14.8 G/DL (ref 12–16)
LYMPHOCYTES # BLD: 2.1 K/UL (ref 0.9–3.6)
LYMPHOCYTES NFR BLD: 25 % (ref 21–52)
MCH RBC QN AUTO: 30.9 PG (ref 24–34)
MCHC RBC AUTO-ENTMCNC: 31.5 G/DL (ref 31–37)
MCV RBC AUTO: 98.1 FL (ref 74–97)
MONOCYTES # BLD: 0.7 K/UL (ref 0.05–1.2)
MONOCYTES NFR BLD: 8 % (ref 3–10)
NEUTS SEG # BLD: 5.3 K/UL (ref 1.8–8)
NEUTS SEG NFR BLD: 63 % (ref 40–73)
PLATELET # BLD AUTO: 203 K/UL (ref 135–420)
PMV BLD AUTO: 11.9 FL (ref 9.2–11.8)
POTASSIUM SERPL-SCNC: 4.4 MMOL/L (ref 3.5–5.5)
RBC # BLD AUTO: 4.79 M/UL (ref 4.2–5.3)
SODIUM SERPL-SCNC: 142 MMOL/L (ref 136–145)
WBC # BLD AUTO: 8.4 K/UL (ref 4.6–13.2)

## 2021-05-18 PROCEDURE — 36415 COLL VENOUS BLD VENIPUNCTURE: CPT

## 2021-05-18 PROCEDURE — 80048 BASIC METABOLIC PNL TOTAL CA: CPT

## 2021-05-18 PROCEDURE — 93005 ELECTROCARDIOGRAM TRACING: CPT

## 2021-05-18 PROCEDURE — 85025 COMPLETE CBC W/AUTO DIFF WBC: CPT

## 2021-05-19 LAB
ATRIAL RATE: 77 BPM
CALCULATED P AXIS, ECG09: 70 DEGREES
CALCULATED R AXIS, ECG10: 2 DEGREES
CALCULATED T AXIS, ECG11: 60 DEGREES
DIAGNOSIS, 93000: NORMAL
P-R INTERVAL, ECG05: 146 MS
Q-T INTERVAL, ECG07: 390 MS
QRS DURATION, ECG06: 94 MS
QTC CALCULATION (BEZET), ECG08: 441 MS
VENTRICULAR RATE, ECG03: 77 BPM

## 2021-05-26 ENCOUNTER — HOSPITAL ENCOUNTER (OUTPATIENT)
Dept: PREADMISSION TESTING | Age: 71
Discharge: HOME | End: 2021-05-26
Payer: MEDICARE

## 2021-05-26 PROCEDURE — U0003 INFECTIOUS AGENT DETECTION BY NUCLEIC ACID (DNA OR RNA); SEVERE ACUTE RESPIRATORY SYNDROME CORONAVIRUS 2 (SARS-COV-2) (CORONAVIRUS DISEASE [COVID-19]), AMPLIFIED PROBE TECHNIQUE, MAKING USE OF HIGH THROUGHPUT TECHNOLOGIES AS DESCRIBED BY CMS-2020-01-R: HCPCS

## 2021-05-27 LAB — SARS-COV-2, COV2NT: NOT DETECTED

## 2021-05-30 ENCOUNTER — ANESTHESIA EVENT (OUTPATIENT)
Dept: SURGERY | Age: 71
End: 2021-05-30
Payer: MEDICARE

## 2021-06-01 ENCOUNTER — APPOINTMENT (OUTPATIENT)
Dept: GENERAL RADIOLOGY | Age: 71
End: 2021-06-01
Attending: UROLOGY
Payer: MEDICARE

## 2021-06-01 ENCOUNTER — HOSPITAL ENCOUNTER (OUTPATIENT)
Age: 71
Setting detail: OUTPATIENT SURGERY
Discharge: HOME | End: 2021-06-01
Attending: UROLOGY | Admitting: UROLOGY
Payer: MEDICARE

## 2021-06-01 ENCOUNTER — ANESTHESIA (OUTPATIENT)
Dept: SURGERY | Age: 71
End: 2021-06-01
Payer: MEDICARE

## 2021-06-01 VITALS
WEIGHT: 287.5 LBS | TEMPERATURE: 97.8 F | RESPIRATION RATE: 16 BRPM | BODY MASS INDEX: 50.94 KG/M2 | HEART RATE: 81 BPM | SYSTOLIC BLOOD PRESSURE: 129 MMHG | HEIGHT: 63 IN | DIASTOLIC BLOOD PRESSURE: 57 MMHG | OXYGEN SATURATION: 100 %

## 2021-06-01 DIAGNOSIS — N32.89 BLADDER MASS: ICD-10-CM

## 2021-06-01 DIAGNOSIS — R82.89 ABNORMAL URINE CYTOLOGY: Primary | ICD-10-CM

## 2021-06-01 PROBLEM — D49.512 NEOPLASM OF LEFT KIDNEY: Status: ACTIVE | Noted: 2021-06-01

## 2021-06-01 PROCEDURE — 88120 CYTP URNE 3-5 PROBES EA SPEC: CPT

## 2021-06-01 PROCEDURE — 74011250636 HC RX REV CODE- 250/636: Performed by: ANESTHESIOLOGY

## 2021-06-01 PROCEDURE — 74011000636 HC RX REV CODE- 636: Performed by: UROLOGY

## 2021-06-01 PROCEDURE — 74011000250 HC RX REV CODE- 250: Performed by: ANESTHESIOLOGY

## 2021-06-01 PROCEDURE — 76010000138 HC OR TIME 0.5 TO 1 HR: Performed by: UROLOGY

## 2021-06-01 PROCEDURE — 2709999900 HC NON-CHARGEABLE SUPPLY: Performed by: UROLOGY

## 2021-06-01 PROCEDURE — 77030040361 HC SLV COMPR DVT MDII -B: Performed by: UROLOGY

## 2021-06-01 PROCEDURE — 74011250636 HC RX REV CODE- 250/636: Performed by: UROLOGY

## 2021-06-01 PROCEDURE — C1758 CATHETER, URETERAL: HCPCS | Performed by: UROLOGY

## 2021-06-01 PROCEDURE — 77030012508 HC MSK AIRWY LMA AMBU -A: Performed by: ANESTHESIOLOGY

## 2021-06-01 PROCEDURE — 76060000032 HC ANESTHESIA 0.5 TO 1 HR: Performed by: UROLOGY

## 2021-06-01 PROCEDURE — 76210000022 HC REC RM PH II 1.5 TO 2 HR: Performed by: UROLOGY

## 2021-06-01 PROCEDURE — 74420 UROGRAPHY RTRGR +-KUB: CPT

## 2021-06-01 PROCEDURE — 88112 CYTOPATH CELL ENHANCE TECH: CPT

## 2021-06-01 PROCEDURE — 76210000063 HC OR PH I REC FIRST 0.5 HR: Performed by: UROLOGY

## 2021-06-01 PROCEDURE — C1769 GUIDE WIRE: HCPCS | Performed by: UROLOGY

## 2021-06-01 PROCEDURE — 88305 TISSUE EXAM BY PATHOLOGIST: CPT

## 2021-06-01 PROCEDURE — 77030020782 HC GWN BAIR PAWS FLX 3M -B: Performed by: UROLOGY

## 2021-06-01 PROCEDURE — 88121 CYTP URINE 3-5 PROBES CMPTR: CPT

## 2021-06-01 RX ORDER — MIDAZOLAM HYDROCHLORIDE 1 MG/ML
INJECTION INTRAMUSCULAR; INTRAVENOUS AS NEEDED
Status: DISCONTINUED | OUTPATIENT
Start: 2021-06-01 | End: 2021-06-01 | Stop reason: HOSPADM

## 2021-06-01 RX ORDER — PROPOFOL 10 MG/ML
INJECTION, EMULSION INTRAVENOUS AS NEEDED
Status: DISCONTINUED | OUTPATIENT
Start: 2021-06-01 | End: 2021-06-01 | Stop reason: HOSPADM

## 2021-06-01 RX ORDER — ONDANSETRON 2 MG/ML
INJECTION INTRAMUSCULAR; INTRAVENOUS AS NEEDED
Status: DISCONTINUED | OUTPATIENT
Start: 2021-06-01 | End: 2021-06-01 | Stop reason: HOSPADM

## 2021-06-01 RX ORDER — TRAMADOL HYDROCHLORIDE 50 MG/1
50 TABLET ORAL
Qty: 10 TABLET | Refills: 0 | Status: SHIPPED | OUTPATIENT
Start: 2021-06-01 | End: 2021-06-04

## 2021-06-01 RX ORDER — EPHEDRINE SULFATE/0.9% NACL/PF 50 MG/5 ML
SYRINGE (ML) INTRAVENOUS AS NEEDED
Status: DISCONTINUED | OUTPATIENT
Start: 2021-06-01 | End: 2021-06-01 | Stop reason: HOSPADM

## 2021-06-01 RX ORDER — LEVOFLOXACIN 5 MG/ML
500 INJECTION, SOLUTION INTRAVENOUS ONCE
Status: DISCONTINUED | OUTPATIENT
Start: 2021-06-01 | End: 2021-06-01 | Stop reason: HOSPADM

## 2021-06-01 RX ORDER — DEXAMETHASONE SODIUM PHOSPHATE 4 MG/ML
INJECTION, SOLUTION INTRA-ARTICULAR; INTRALESIONAL; INTRAMUSCULAR; INTRAVENOUS; SOFT TISSUE AS NEEDED
Status: DISCONTINUED | OUTPATIENT
Start: 2021-06-01 | End: 2021-06-01 | Stop reason: HOSPADM

## 2021-06-01 RX ORDER — KETOROLAC TROMETHAMINE 15 MG/ML
INJECTION, SOLUTION INTRAMUSCULAR; INTRAVENOUS AS NEEDED
Status: DISCONTINUED | OUTPATIENT
Start: 2021-06-01 | End: 2021-06-01 | Stop reason: HOSPADM

## 2021-06-01 RX ORDER — LIDOCAINE HYDROCHLORIDE 20 MG/ML
INJECTION, SOLUTION EPIDURAL; INFILTRATION; INTRACAUDAL; PERINEURAL AS NEEDED
Status: DISCONTINUED | OUTPATIENT
Start: 2021-06-01 | End: 2021-06-01 | Stop reason: HOSPADM

## 2021-06-01 RX ORDER — LEVOFLOXACIN 500 MG/1
500 TABLET, FILM COATED ORAL DAILY
Qty: 4 TABLET | Refills: 0 | Status: SHIPPED | OUTPATIENT
Start: 2021-06-02 | End: 2021-06-06

## 2021-06-01 RX ORDER — FENTANYL CITRATE 50 UG/ML
INJECTION, SOLUTION INTRAMUSCULAR; INTRAVENOUS AS NEEDED
Status: DISCONTINUED | OUTPATIENT
Start: 2021-06-01 | End: 2021-06-01 | Stop reason: HOSPADM

## 2021-06-01 RX ORDER — SODIUM CHLORIDE, SODIUM LACTATE, POTASSIUM CHLORIDE, CALCIUM CHLORIDE 600; 310; 30; 20 MG/100ML; MG/100ML; MG/100ML; MG/100ML
125 INJECTION, SOLUTION INTRAVENOUS CONTINUOUS
Status: DISCONTINUED | OUTPATIENT
Start: 2021-06-01 | End: 2021-06-01 | Stop reason: HOSPADM

## 2021-06-01 RX ADMIN — SODIUM CHLORIDE, SODIUM LACTATE, POTASSIUM CHLORIDE, AND CALCIUM CHLORIDE 125 ML/HR: 600; 310; 30; 20 INJECTION, SOLUTION INTRAVENOUS at 06:36

## 2021-06-01 RX ADMIN — FENTANYL CITRATE 100 MCG: 50 INJECTION, SOLUTION INTRAMUSCULAR; INTRAVENOUS at 07:42

## 2021-06-01 RX ADMIN — Medication 10 MG: at 07:55

## 2021-06-01 RX ADMIN — ONDANSETRON HYDROCHLORIDE 4 MG: 2 INJECTION INTRAMUSCULAR; INTRAVENOUS at 08:21

## 2021-06-01 RX ADMIN — PROPOFOL 150 MG: 10 INJECTION, EMULSION INTRAVENOUS at 07:42

## 2021-06-01 RX ADMIN — MIDAZOLAM HYDROCHLORIDE 2 MG: 1 INJECTION, SOLUTION INTRAMUSCULAR; INTRAVENOUS at 07:34

## 2021-06-01 RX ADMIN — LIDOCAINE HYDROCHLORIDE 60 MG: 20 INJECTION, SOLUTION EPIDURAL; INFILTRATION; INTRACAUDAL; PERINEURAL at 07:42

## 2021-06-01 RX ADMIN — DEXAMETHASONE SODIUM PHOSPHATE 4 MG: 4 INJECTION, SOLUTION INTRAMUSCULAR; INTRAVENOUS at 07:55

## 2021-06-01 RX ADMIN — KETOROLAC TROMETHAMINE 30 MG: 15 INJECTION, SOLUTION INTRAMUSCULAR; INTRAVENOUS at 08:20

## 2021-06-01 NOTE — ANESTHESIA PREPROCEDURE EVALUATION
Relevant Problems   PERSONAL HX & FAMILY HX OF CANCER   (+) Cancer of bladder neck (HCC)   (+) Endometrial cancer (HCC)       Anesthetic History   No history of anesthetic complications            Review of Systems / Medical History  Patient summary reviewed, nursing notes reviewed and pertinent labs reviewed    Pulmonary  Within defined limits                 Neuro/Psych       CVA  TIA     Cardiovascular    Hypertension                   GI/Hepatic/Renal  Within defined limits              Endo/Other        Morbid obesity     Other Findings              Physical Exam    Airway  Mallampati: II  TM Distance: 4 - 6 cm  Neck ROM: normal range of motion   Mouth opening: Normal     Cardiovascular  Regular rate and rhythm,  S1 and S2 normal,  no murmur, click, rub, or gallop             Dental  No notable dental hx       Pulmonary  Breath sounds clear to auscultation               Abdominal  GI exam deferred       Other Findings            Anesthetic Plan    ASA: 3  Anesthesia type: general          Induction: Intravenous  Anesthetic plan and risks discussed with: Patient

## 2021-06-01 NOTE — PERIOP NOTES
Discharge instructions completed via phone call to Mine natarajan for questions - AVS med list reviewed for dupicates. Instructions reviewed with patient - verbalizes understanding - tolerating PO fluids.

## 2021-06-01 NOTE — ANESTHESIA POSTPROCEDURE EVALUATION
Post-Anesthesia Evaluation and Assessment    Cardiovascular Function/Vital Signs  Visit Vitals  BP (!) 128/58   Pulse 80   Temp 36.5 °C (97.7 °F)   Resp 14   Ht 5' 3\" (1.6 m)   Wt 130.4 kg (287 lb 8 oz)   SpO2 95%   BMI 50.93 kg/m²       Patient is status post Procedure(s):  CYSTOSCOPY, SELECTIVE CYTOLOGY, BLADDER BIOPSY, BILATERAL RETROGRADE PYELOGRAM, LEFT URETEROSCOPY, FULGERATION WITH C-ARM. Nausea/Vomiting: Controlled. Postoperative hydration reviewed and adequate. Pain:  Pain Scale 1: Numeric (0 - 10) (06/01/21 0914)  Pain Intensity 1: 0 (06/01/21 0914)   Managed. Neurological Status:   Neuro (WDL): Within Defined Limits (06/01/21 0836)   At baseline. Mental Status and Level of Consciousness: Baseline and stable. Pulmonary Status:   O2 Device: None (Room air) (06/01/21 0836)   Adequate oxygenation and airway patent. Complications related to anesthesia: None    Post-anesthesia assessment completed. No concerns. Patient has met all discharge requirements.     Signed By: Hi Tucker MD

## 2021-06-01 NOTE — H&P
History and Physical    Subjective:      Mary Valente is a 78 y.o. female with a h/o bladder CIS. She has had multiple intravesical therapies. She has had persistently positive urine cytologies and an abnormal retrograde pyelogram previously. She presents for selective cytologies and bladder biospies. No fevers or hematuria or pain    Past Medical History:   Diagnosis Date    Cancer [facility]) 2019    bladder, chemo  instilled in bladder    High cholesterol     Hypertension 2009    not on meds at present 5-11-21    [facility] obesity ([facility] [address]     Stroke [facility]) 2009    TIA, no residual effects    Uterus cancer ([facility] [address] 2010     Past Surgical History:   Procedure Laterality Date    HX APPENDECTOMY  2010    HX DILATION AND CURETTAGE      X 2    HX HYSTERECTOMY  10/2010    BSO    HX OTHER SURGICAL Right     eye- chalazion    HX TONSILLECTOMY      HX UROLOGICAL  12/31/2019    cysto, TURBt    HX UROLOGICAL      cysto      History reviewed. No pertinent family history. Social History     Tobacco Use    Smoking status: Never Smoker    Smokeless tobacco: Never Used   Substance Use Topics    Alcohol use: Yes     Comment: Rarely     Allergies   Allergen Reactions    Pcn [Penicillins] Rash, Itching and Swelling     1971     Prior to Admission medications    Medication Sig Start Date End Date Taking? Authorizing Provider   BCG live (EVA) 50 mg injection 50 mg by IntraVESical route once. Yes Provider, Historical   simvastatin (ZOCOR) 40 mg tablet Take 40 mg by mouth nightly. Indications: high cholesterol   Yes Provider, Historical   clopidogreL (Plavix) 75 mg tab Take 75 mg by mouth daily. Provider, Historical        Review of Systems:  A comprehensive review of systems was negative except for that written in the HPI.      Objective:        Patient Vitals for the past 8 hrs:   BP Temp Pulse Resp SpO2 Height Weight   06/01/21 0552 (!) 143/60 97.5 °F (36.4 °C) 71 14 99 % 5' 3\" (1.6 m) 130.4 kg (287 lb 8 oz)       Temp (24hrs), Av.5 °F (36.4 °C), Min:97.5 °F (36.4 °C), Max:97.5 °F (36.4 °C)      Physical Exam:  NAD  RRR  Breathing easy  Abd - soft, nd, nt  Back - No CVA tendnerness  Ext - mild edema      Assessment:     abnromal urine cytolgoy  CIS of bladder  Mass in left renal collecting system      Plan:     1. The risks, benefits, complications, treatment options, and expected outcomes were discussed with the patient. The patient understands the risks, any and all questions were answered to the patient's satisfaction.   2. Proceed with outpatient cysto and selective cytologies and bladder biospies and possible left ureteroscopy

## 2021-06-01 NOTE — DISCHARGE INSTRUCTIONS
DISCHARGE SUMMARY from Nurse    PATIENT INSTRUCTIONS:    After general anesthesia or intravenous sedation, for 24 hours or while taking prescription Narcotics:  · Limit your activities  · Do not drive and operate hazardous machinery  · Do not make important personal or business decisions  · Do  not drink alcoholic beverages  · If you have not urinated within 8 hours after discharge, please contact your surgeon on call. Report the following to your surgeon:  · Excessive pain, swelling, redness or odor of or around the surgical area  · Temperature over 100.5  · Nausea and vomiting lasting longer than 4 hours or if unable to take medications  · Any signs of decreased circulation or nerve impairment to extremity: change in color, persistent  numbness, tingling, coldness or increase pain  · Any questions    What to do at Home:  Recommended activity: Activity as tolerated and no driving for today, Ambulate in house, No driving while on analgesics and No heavy lifting until advise    If you experience any of the following symptoms fevers, chills, pain, nausea, difficulty urinating, active bleeding ( thick bloody urine )please follow up with Dr. Brenda Bowling. *  Please give a list of your current medications to your Primary Care Provider. *  Please update this list whenever your medications are discontinued, doses are      changed, or new medications (including over-the-counter products) are added. *  Please carry medication information at all times in case of emergency situations. These are general instructions for a healthy lifestyle:    No smoking/ No tobacco products/ Avoid exposure to second hand smoke  Surgeon General's Warning:  Quitting smoking now greatly reduces serious risk to your health.     Obesity, smoking, and sedentary lifestyle greatly increases your risk for illness    A healthy diet, regular physical exercise & weight monitoring are important for maintaining a healthy lifestyle    You may be retaining fluid if you have a history of heart failure or if you experience any of the following symptoms:  Weight gain of 3 pounds or more overnight or 5 pounds in a week, increased swelling in our hands or feet or shortness of breath while lying flat in bed. Please call your doctor as soon as you notice any of these symptoms; do not wait until your next office visit. patient and caregiver      The discharge information has been reviewed with the patient and caregiver. The patient and caregiver verbalized understanding. Discharge medications reviewed with the patient and caregiver and appropriate educational materials and side effects teaching were provided. Patient armband removed and shredded. ___________________________________________________________________________________________________________________________________      Resume pre hospital diet  Drink plenty of fluids  Ambulate in house  Avoid heavy lifting, pushing, pulling or straining  Avoid constipation  Take prescription as directed  Shower tomorrow  Contact Physician if fever, chills, pain, nausea, vomiting, difficulty urinating, not urinating, or if urine is thick with clots, or if they have urinated small amounts and feel like they have not emptied and have low abdominal pain. Learning About Coronavirus [phone])  Coronavirus [phone]): Overview  What is coronavirus (COVID-19)? The coronavirus disease (COVID-19) is caused by a virus. It is an illness that was first found in [place], [place], in December 2019. It has since spread worldwide. The virus can cause fever, cough, and trouble breathing. In severe cases, it can cause pneumonia and make it hard to breathe without help. It can cause death. Coronaviruses are a large group of viruses. They cause the common cold. They also cause more serious illnesses like [place] respiratory syndrome (MERS) and severe acute respiratory syndrome (SARS).  COVID-19 is caused by a novel coronavirus. That means it's a new type that has not been seen in people before. This virus spreads person-to-person through droplets from coughing and sneezing. It can also spread when you are close to someone who is infected. And it can spread when you touch something that has the virus on it, such as a doorknob or a tabletop. What can you do to protect yourself from coronavirus (COVID-19)? The best way to protect yourself from getting sick is to:  · Avoid areas where there is an outbreak. · Avoid contact with people who may be infected. · Wash your hands often with soap or alcohol-based hand sanitizers. · Avoid crowds and try to stay at least 6 feet away from other people. · Wash your hands often, especially after you cough or sneeze. Use soap and water, and scrub for at least 20 seconds. If soap and water aren't available, use an alcohol-based hand . · Avoid touching your mouth, nose, and eyes. What can you do to avoid spreading the virus to others? To help avoid spreading the virus to others:  · Cover your mouth with a tissue when you cough or sneeze. Then throw the tissue in the trash. · Use a disinfectant to clean things that you touch often. · Stay home if you are sick or have been exposed to the virus. Don't go to school, work, or public areas. And don't use public transportation. · If you are sick:  ? Leave your home only if you need to get medical care. But call the doctor's office first so they know you're coming. And wear a face mask, if you have one.  ? If you have a face mask, wear it whenever you're around other people. It can help stop the spread of the virus when you cough or sneeze. ? Clean and disinfect your home every day. Use household  and disinfectant wipes or sprays. Take special care to clean things that you grab with your hands. These include doorknobs, remote controls, phones, and handles on your refrigerator and microwave.  And don't forget countertops, tabletops, bathrooms, and computer keyboards. When to call for help  Call 911 anytime you think you may need emergency care. For example, call if:  · You have severe trouble breathing. (You can't talk at all.)  · You have constant chest pain or pressure. · You are severely dizzy or lightheaded. · You are confused or can't think clearly. · Your face and lips have a blue color. · You pass out (lose consciousness) or are very hard to wake up. Call your doctor now if you develop symptoms such as:  · Shortness of breath. · Fever. · Cough. If you need to get care, call ahead to the doctor's office for instructions before you go. Make sure you wear a face mask, if you have one, to prevent exposing other people to the virus. Where can you get the latest information? The following health organizations are tracking and studying this virus. Their websites contain the most up-to-date information. Naveen Lundberg also learn what to do if you think you may have been exposed to the virus. · U.S. Centers for Disease Control and Prevention (CDC): The CDC provides updated news about the disease and travel advice. The website also tells you how to prevent the spread of infection. www.cdc.gov  · World Health Organization [facility]): WHO offers information about the virus outbreaks. WHO also has travel advice. www.who.int  Current as of: April 1, 2020               Content Version: 12.4  © 6180-1493 [employer]. Care instructions adapted under license by your healthcare professional. If you have questions about a medical condition or this instruction, always ask your healthcare professional. [address] any warranty or liability for your use of this information.

## 2021-06-01 NOTE — OP NOTES
[facility]  OPERATIVE REPORT    Name:  Reema Contreras  MR#:   [id number]  :  1950  ACCOUNT #:  [de-identified]  DATE OF SERVICE:  2021    PREOPERATIVE DIAGNOSES:  Carcinoma in situ of the bladder, abnormal urine cytology, abnormal findings on diagnostic imaging of left kidney and gross hematuria. POSTOPERATIVE DIAGNOSES:  Carcinoma in situ of the bladder, abnormal cytology, gross hematuria. PROCEDURE PERFORMED:  Cystoscopy, selective cytologies, bilateral retrograde pyelograms with interpretation, bilateral bladder biopsies and fulguration with C-arm. SURGEON:  Chrissy Neely MD    ASSISTANT:  None. ANESTHESIA:  General.    ESTIMATED BLOOD LOSS:  Minimal.    COMPLICATIONS:  None. SPECIMENS REMOVED:  Bladder biopsy as well as bladder washings for cytology, right ureteral washing for cytology and left-sided washing for cytology. IMPLANTS:  None. DRAINS:  None. FINDINGS:  The patient had a bladder with some patchy erythema scattered throughout and little bleeding that was fulgurated. Bilateral normal retrograde pyelograms. CLINICAL NOTE:  The patient is a 68-year-old female with a history of bladder cancer who had recent recurrent CIS. She also has abnormal urine cytology. She presents for evaluation and treatment. PROCEDURE:  Preoperatively, risks and benefits of the surgery were described to the patient. The risks included, but were not limited to bleeding, infection, injury to the bladder, injury to the ureter, injury to the kidney, and possible need for future procedures. The patient understood the risks and signed the informed consent. The patient was taken to the operating room and placed on the OR table in the supine position. She was administered general anesthetic. She was administered intravenous antibiotics. She was placed in dorsal lithotomy position and prepped and draped in the usual sterile manner.   A 22-Bhutanese cystoscope and sheath were then inserted transurethrally atraumatically under direct vision using a 30-degree lens. Panendoscopy was done. Bilateral ureteral orifices were in normal anatomic position. There were no papillary tumors or stones. However, there was patchy diffuse erythema throughout the bladder. The bladder was emptied completely and washed with saline and emptied again and washed with saline and then the washing was sent for cytology as a bladder wash. Next, there were some areas of small bleeding in the bladder and these small areas were fulgurated with Bugbee electrocautery. I then did biopsies of areas of erythema on the right bladder neck near where her previous tumor had been as well as the posterior bladder wall. These were done with cup biopsy forceps. I then fulgurated all bleeding with Bugbee electrocautery. I then cannulated the right ureteral orifice with a 5-Qatari open-ended ureteral catheter and the open-ended catheter was sent up to the kidney in a retrograde fashion. I obtained urine from the renal pelvis. I did a washing with some saline and collected more urine from the renal pelvis as well as from the length of the ureter. Once I had more than 20 mL, I stopped. I then did a retrograde pyelogram in the usual manner using 12 mL of Visipaque dye. There were no filling defects along the length of the ureter. Up in the kidney, there was a filling defect in the lower pole consistent with her known large stone. No other filling defects were seen. Next, I removed that open-ended catheter and it was thrown away. I inserted a 5-Qatari open-ended catheter through the cystoscope into the bladder and up the left ureter. This was a fresh one. I sent it all the way up to the kidney. I obtained urine from the kidney. I then did washings with saline into the kidney and along the length of the ureter and removed all the urine and washings.   Once I had more than 20 mL and had gone along the full length of the ureter, I then did a retrograde pyelogram in the usual manner using 14 mL of Visipaque dye. There were no filling defects along the entire length of the ureter. The ureter was somewhat narrowed but there were no filling defects. Next, up in the kidney, there were no filling defects seen. There was normal contours without hydronephrosis or blunted calyces. With everything being normal on this left side, I ultimately elected not to do ureteroscopy. I slowly backed the open-ended catheter out. The bladder was emptied. The scope was removed. I then took the patient out of lithotomy position, revived from anesthesia and taken to recovery room in stable condition.       MD ARNOLDO [email or web address]  D:  06/01/2021 8:51  T:  06/01/2021 11:01  JOB #:  [id number]

## 2021-06-01 NOTE — BRIEF OP NOTE
Brief Postoperative Note    Patient: Zehra Martinez  YOB: 1950  MRN: [id number]    Date of Procedure: 6/1/2021     Pre-Op Diagnosis: CIS OF BLADDER, ABNORMAL URINE CYTOLOGY,  ABNORMAL FINDINGS ON DIAGNOSTIC IMAGING OF LEFT KIDNEY, GROSS HEMATURIA,     Post-Op Diagnosis: CIS OF BLADDER, ABNORMAL URINE CYTOLOGY, GROSS HEMATURIA    Procedure(s):  CYSTOSCOPY, SELECTIVE CYTOLOGY, BILATERAL RETROGRADE PYELOGRAM WITH INTERPRETATION, BLADDER BIOPSY, FULGERATION WITH C-ARM    Surgeon(s):  Francisco J Piña MD    Surgical Assistant: NONE    Anesthesia: GENERAL    Estimated Blood Loss (mL): Minimal    Complications: None    Specimens:   ID Type Source Tests Collected by Time Destination   2 : BLADDER BIOPSY Preservative Bladder  Francisco J Piña MD 6/1/2021 0801 Pathology   1 : BLADDER WASHING Fresh Bladder Wash  Francisco J Piña MD 6/1/2021 0805 Cytology   2 : RIGHT SIDED WASHING Fresh URETER, RIGHT  Francisco J Piña MD 6/1/2021 7965 Cytology   3 : LEFT SIDED WASHING Fresh URETER, LEFT  Francisco J Piña MD 6/1/2021 0809 Cytology        Implants: * No implants in log *    Drains: * No LDAs found *    Findings: BLADDER WITH SOME PATCHY ERYTHEMA SCATTERED THROUGHOUT. BILATERAL NORMAL RPG'S.       Electronically Signed by Quita Zayas MD on 6/1/2021 at 8:43 AM

## 2021-06-01 NOTE — PERIOP NOTES
TRANSFER - IN REPORT:    Verbal report received from DARNELL & Dr. Fabi Gao on Israel Rviera  being received from OR (unit) for routine post - op      Report consisted of patients Situation, Background, Assessment and   Recommendations(SBAR). Information from the following report(s) SBAR was reviewed with the receiving nurse. Opportunity for questions and clarification was provided. Assessment completed upon patients arrival to unit and care assumed.

## 2021-06-01 NOTE — PERIOP NOTES
Reviewed PTA medication list with patient/caregiver and patient/caregiver denies any additional medications. Patient admits to having a responsible adult care for them at home for at least 24 hours after surgery. Patient encouraged to use gown warming system and informed that using said warming gown to regulate body temperature prior to a procedure has been shown to help reduce the risks of blood clots and infection. Patient's pharmacy of choice verified and documented in PTA medication section. Dual skin assessment & fall risk band verification completed with Shanel Walls RN.

## 2021-10-08 ENCOUNTER — TRANSCRIBE ORDER (OUTPATIENT)
Dept: REGISTRATION | Age: 71
End: 2021-10-08

## 2021-10-08 ENCOUNTER — HOSPITAL ENCOUNTER (OUTPATIENT)
Dept: PREADMISSION TESTING | Age: 71
Discharge: HOME | End: 2021-10-08
Payer: MEDICARE

## 2021-10-08 DIAGNOSIS — D09.0 CARCINOMA IN SITU OF BLADDER: Primary | ICD-10-CM

## 2021-10-08 DIAGNOSIS — D09.0 CARCINOMA IN SITU OF BLADDER: ICD-10-CM

## 2021-10-08 LAB
ANION GAP SERPL CALC-SCNC: 6 MMOL/L (ref 3–18)
ATRIAL RATE: 69 BPM
BASOPHILS # BLD: 0 K/UL (ref 0–0.1)
BASOPHILS NFR BLD: 0 % (ref 0–2)
BUN SERPL-MCNC: 25 MG/DL (ref 7–18)
BUN/CREAT SERPL: 21 (ref 12–20)
CALCIUM SERPL-MCNC: 8.7 MG/DL (ref 8.5–10.1)
CALCULATED P AXIS, ECG09: 71 DEGREES
CALCULATED T AXIS, ECG11: 58 DEGREES
CHLORIDE SERPL-SCNC: 110 MMOL/L (ref 100–111)
CO2 SERPL-SCNC: 27 MMOL/L (ref 21–32)
CREAT SERPL-MCNC: 1.18 MG/DL (ref 0.6–1.3)
DIAGNOSIS, 93000: NORMAL
DIFFERENTIAL METHOD BLD: NORMAL
EOSINOPHIL # BLD: 0.2 K/UL (ref 0–0.4)
EOSINOPHIL NFR BLD: 3 % (ref 0–5)
ERYTHROCYTE [DISTWIDTH] IN BLOOD BY AUTOMATED COUNT: 12.5 % (ref 11.6–14.5)
GLUCOSE SERPL-MCNC: 105 MG/DL (ref 74–99)
HCT VFR BLD AUTO: 44.7 % (ref 35–45)
HGB BLD-MCNC: 14.5 G/DL (ref 12–16)
LYMPHOCYTES # BLD: 2 K/UL (ref 0.9–3.6)
LYMPHOCYTES NFR BLD: 25 % (ref 21–52)
MCH RBC QN AUTO: 30.5 PG (ref 24–34)
MCHC RBC AUTO-ENTMCNC: 32.4 G/DL (ref 31–37)
MCV RBC AUTO: 93.9 FL (ref 78–100)
MONOCYTES # BLD: 0.6 K/UL (ref 0.05–1.2)
MONOCYTES NFR BLD: 7 % (ref 3–10)
NEUTS SEG # BLD: 5.2 K/UL (ref 1.8–8)
NEUTS SEG NFR BLD: 65 % (ref 40–73)
P-R INTERVAL, ECG05: 146 MS
PLATELET # BLD AUTO: 201 K/UL (ref 135–420)
PMV BLD AUTO: 11.1 FL (ref 9.2–11.8)
POTASSIUM SERPL-SCNC: 4.6 MMOL/L (ref 3.5–5.5)
Q-T INTERVAL, ECG07: 410 MS
QRS DURATION, ECG06: 94 MS
QTC CALCULATION (BEZET), ECG08: 439 MS
RBC # BLD AUTO: 4.76 M/UL (ref 4.2–5.3)
SODIUM SERPL-SCNC: 143 MMOL/L (ref 136–145)
VENTRICULAR RATE, ECG03: 69 BPM
WBC # BLD AUTO: 8 K/UL (ref 4.6–13.2)

## 2021-10-08 PROCEDURE — 93005 ELECTROCARDIOGRAM TRACING: CPT

## 2021-10-08 PROCEDURE — 36415 COLL VENOUS BLD VENIPUNCTURE: CPT

## 2021-10-08 PROCEDURE — 85025 COMPLETE CBC W/AUTO DIFF WBC: CPT

## 2021-10-08 PROCEDURE — 80048 BASIC METABOLIC PNL TOTAL CA: CPT

## 2021-10-12 ENCOUNTER — HOSPITAL ENCOUNTER (OUTPATIENT)
Dept: PREADMISSION TESTING | Age: 71
Discharge: HOME | End: 2021-10-12

## 2021-10-12 RX ORDER — LISINOPRIL 10 MG/1
TABLET ORAL DAILY
COMMUNITY

## 2021-10-12 RX ORDER — CLOPIDOGREL BISULFATE 75 MG/1
TABLET ORAL
COMMUNITY

## 2021-10-12 RX ORDER — CIPROFLOXACIN 2 MG/ML
400 INJECTION, SOLUTION INTRAVENOUS ONCE
Status: CANCELLED | OUTPATIENT
Start: 2021-10-12 | End: 2021-10-12

## 2021-10-12 RX ORDER — SODIUM CHLORIDE, SODIUM LACTATE, POTASSIUM CHLORIDE, CALCIUM CHLORIDE 600; 310; 30; 20 MG/100ML; MG/100ML; MG/100ML; MG/100ML
125 INJECTION, SOLUTION INTRAVENOUS CONTINUOUS
Status: CANCELLED | OUTPATIENT
Start: 2021-10-12

## 2021-10-12 NOTE — PERIOP NOTES
PAT phone interview completed with patient. Patient made aware to be NPO and to quarantine. Patient made aware to come 10/21 for COVID test. Patient stated she had two doses of COVID vaccine. Patient stated she will bring proof of COVID vaccination and will get facility weight on 10/21. Patient stated she has a ride home for discharge and someone to stay with her for 24 hours after procedure. Patient made aware not to wear jewelry, makeup, nail polish, lotion, oil or spray on DOS. Patient stated she does not have a DNR order.

## 2021-10-21 ENCOUNTER — HOSPITAL ENCOUNTER (OUTPATIENT)
Dept: PREADMISSION TESTING | Age: 71
Discharge: HOME | End: 2021-10-21
Payer: MEDICARE

## 2021-10-21 VITALS — WEIGHT: 280 LBS | BODY MASS INDEX: 47.8 KG/M2 | HEIGHT: 64 IN

## 2021-10-21 PROCEDURE — U0003 INFECTIOUS AGENT DETECTION BY NUCLEIC ACID (DNA OR RNA); SEVERE ACUTE RESPIRATORY SYNDROME CORONAVIRUS 2 (SARS-COV-2) (CORONAVIRUS DISEASE [COVID-19]), AMPLIFIED PROBE TECHNIQUE, MAKING USE OF HIGH THROUGHPUT TECHNOLOGIES AS DESCRIBED BY CMS-2020-01-R: HCPCS

## 2021-10-22 LAB — SARS-COV-2, NAA: NOT DETECTED

## 2021-10-26 ENCOUNTER — APPOINTMENT (OUTPATIENT)
Dept: GENERAL RADIOLOGY | Age: 71
End: 2021-10-26
Attending: UROLOGY
Payer: MEDICARE

## 2021-10-26 ENCOUNTER — ANESTHESIA EVENT (OUTPATIENT)
Dept: SURGERY | Age: 71
End: 2021-10-26
Payer: MEDICARE

## 2021-10-26 ENCOUNTER — HOSPITAL ENCOUNTER (OUTPATIENT)
Age: 71
Setting detail: OUTPATIENT SURGERY
Discharge: HOME | End: 2021-10-26
Attending: UROLOGY | Admitting: UROLOGY
Payer: MEDICARE

## 2021-10-26 ENCOUNTER — ANESTHESIA (OUTPATIENT)
Dept: SURGERY | Age: 71
End: 2021-10-26
Payer: MEDICARE

## 2021-10-26 VITALS
HEART RATE: 60 BPM | HEIGHT: 64 IN | WEIGHT: 280.5 LBS | SYSTOLIC BLOOD PRESSURE: 142 MMHG | DIASTOLIC BLOOD PRESSURE: 69 MMHG | OXYGEN SATURATION: 100 % | BODY MASS INDEX: 47.89 KG/M2 | TEMPERATURE: 97.5 F | RESPIRATION RATE: 16 BRPM

## 2021-10-26 DIAGNOSIS — C67.5 CANCER OF BLADDER NECK (HCC): Primary | ICD-10-CM

## 2021-10-26 PROCEDURE — 76210000021 HC REC RM PH II 0.5 TO 1 HR: Performed by: UROLOGY

## 2021-10-26 PROCEDURE — 77030018831 HC SOL IRR H20 BAXT -A: Performed by: UROLOGY

## 2021-10-26 PROCEDURE — 88307 TISSUE EXAM BY PATHOLOGIST: CPT

## 2021-10-26 PROCEDURE — 74011250636 HC RX REV CODE- 250/636: Performed by: ANESTHESIOLOGY

## 2021-10-26 PROCEDURE — 77030040361 HC SLV COMPR DVT MDII -B: Performed by: UROLOGY

## 2021-10-26 PROCEDURE — 74011250636 HC RX REV CODE- 250/636: Performed by: UROLOGY

## 2021-10-26 PROCEDURE — 74011000250 HC RX REV CODE- 250: Performed by: ANESTHESIOLOGY

## 2021-10-26 PROCEDURE — 74011250636 HC RX REV CODE- 250/636: Performed by: NURSE ANESTHETIST, CERTIFIED REGISTERED

## 2021-10-26 PROCEDURE — 76210000063 HC OR PH I REC FIRST 0.5 HR: Performed by: UROLOGY

## 2021-10-26 PROCEDURE — 76010000154 HC OR TIME FIRST 0.5 HR: Performed by: UROLOGY

## 2021-10-26 PROCEDURE — 77030013079 HC BLNKT BAIR HGGR 3M -A: Performed by: ANESTHESIOLOGY

## 2021-10-26 PROCEDURE — 76060000031 HC ANESTHESIA FIRST 0.5 HR: Performed by: UROLOGY

## 2021-10-26 PROCEDURE — 2709999900 HC NON-CHARGEABLE SUPPLY: Performed by: UROLOGY

## 2021-10-26 PROCEDURE — 77030012508 HC MSK AIRWY LMA AMBU -A: Performed by: ANESTHESIOLOGY

## 2021-10-26 PROCEDURE — 77030020782 HC GWN BAIR PAWS FLX 3M -B: Performed by: UROLOGY

## 2021-10-26 PROCEDURE — 88305 TISSUE EXAM BY PATHOLOGIST: CPT

## 2021-10-26 RX ORDER — ONDANSETRON 2 MG/ML
4 INJECTION INTRAMUSCULAR; INTRAVENOUS ONCE
Status: DISCONTINUED | OUTPATIENT
Start: 2021-10-26 | End: 2021-10-26 | Stop reason: HOSPADM

## 2021-10-26 RX ORDER — DEXAMETHASONE SODIUM PHOSPHATE 4 MG/ML
INJECTION, SOLUTION INTRA-ARTICULAR; INTRALESIONAL; INTRAMUSCULAR; INTRAVENOUS; SOFT TISSUE AS NEEDED
Status: DISCONTINUED | OUTPATIENT
Start: 2021-10-26 | End: 2021-10-26 | Stop reason: HOSPADM

## 2021-10-26 RX ORDER — ONDANSETRON 2 MG/ML
INJECTION INTRAMUSCULAR; INTRAVENOUS AS NEEDED
Status: DISCONTINUED | OUTPATIENT
Start: 2021-10-26 | End: 2021-10-26 | Stop reason: HOSPADM

## 2021-10-26 RX ORDER — HYDROMORPHONE HYDROCHLORIDE 1 MG/ML
0.2 INJECTION, SOLUTION INTRAMUSCULAR; INTRAVENOUS; SUBCUTANEOUS AS NEEDED
Status: DISCONTINUED | OUTPATIENT
Start: 2021-10-26 | End: 2021-10-28 | Stop reason: HOSPADM

## 2021-10-26 RX ORDER — SODIUM CHLORIDE, SODIUM LACTATE, POTASSIUM CHLORIDE, CALCIUM CHLORIDE 600; 310; 30; 20 MG/100ML; MG/100ML; MG/100ML; MG/100ML
125 INJECTION, SOLUTION INTRAVENOUS CONTINUOUS
Status: DISCONTINUED | OUTPATIENT
Start: 2021-10-26 | End: 2021-10-28 | Stop reason: HOSPADM

## 2021-10-26 RX ORDER — SODIUM CHLORIDE, SODIUM LACTATE, POTASSIUM CHLORIDE, CALCIUM CHLORIDE 600; 310; 30; 20 MG/100ML; MG/100ML; MG/100ML; MG/100ML
25 INJECTION, SOLUTION INTRAVENOUS CONTINUOUS
Status: DISCONTINUED | OUTPATIENT
Start: 2021-10-26 | End: 2021-10-28 | Stop reason: HOSPADM

## 2021-10-26 RX ORDER — HYDROCODONE BITARTRATE AND ACETAMINOPHEN 5; 325 MG/1; MG/1
1 TABLET ORAL AS NEEDED
Status: DISCONTINUED | OUTPATIENT
Start: 2021-10-26 | End: 2021-10-28 | Stop reason: HOSPADM

## 2021-10-26 RX ORDER — FENTANYL CITRATE 50 UG/ML
INJECTION, SOLUTION INTRAMUSCULAR; INTRAVENOUS AS NEEDED
Status: DISCONTINUED | OUTPATIENT
Start: 2021-10-26 | End: 2021-10-26 | Stop reason: HOSPADM

## 2021-10-26 RX ORDER — MIDAZOLAM HYDROCHLORIDE 1 MG/ML
INJECTION, SOLUTION INTRAMUSCULAR; INTRAVENOUS AS NEEDED
Status: DISCONTINUED | OUTPATIENT
Start: 2021-10-26 | End: 2021-10-26 | Stop reason: HOSPADM

## 2021-10-26 RX ORDER — LIDOCAINE HYDROCHLORIDE 20 MG/ML
INJECTION, SOLUTION EPIDURAL; INFILTRATION; INTRACAUDAL; PERINEURAL AS NEEDED
Status: DISCONTINUED | OUTPATIENT
Start: 2021-10-26 | End: 2021-10-26 | Stop reason: HOSPADM

## 2021-10-26 RX ORDER — DIPHENHYDRAMINE HYDROCHLORIDE 50 MG/ML
12.5 INJECTION, SOLUTION INTRAMUSCULAR; INTRAVENOUS
Status: DISCONTINUED | OUTPATIENT
Start: 2021-10-26 | End: 2021-10-28 | Stop reason: HOSPADM

## 2021-10-26 RX ORDER — HYDROMORPHONE HYDROCHLORIDE 1 MG/ML
0.5 INJECTION, SOLUTION INTRAMUSCULAR; INTRAVENOUS; SUBCUTANEOUS
Status: DISCONTINUED | OUTPATIENT
Start: 2021-10-26 | End: 2021-10-28 | Stop reason: HOSPADM

## 2021-10-26 RX ORDER — TRAMADOL HYDROCHLORIDE 50 MG/1
50 TABLET ORAL
Qty: 10 TABLET | Refills: 0 | Status: SHIPPED | OUTPATIENT
Start: 2021-10-26 | End: 2021-10-29

## 2021-10-26 RX ORDER — FLUMAZENIL 0.1 MG/ML
0.2 INJECTION INTRAVENOUS
Status: DISCONTINUED | OUTPATIENT
Start: 2021-10-26 | End: 2021-10-28 | Stop reason: HOSPADM

## 2021-10-26 RX ORDER — CIPROFLOXACIN 2 MG/ML
400 INJECTION, SOLUTION INTRAVENOUS ONCE
Status: COMPLETED | OUTPATIENT
Start: 2021-10-26 | End: 2021-10-26

## 2021-10-26 RX ORDER — PROPOFOL 10 MG/ML
INJECTION, EMULSION INTRAVENOUS AS NEEDED
Status: DISCONTINUED | OUTPATIENT
Start: 2021-10-26 | End: 2021-10-26 | Stop reason: HOSPADM

## 2021-10-26 RX ORDER — CIPROFLOXACIN 500 MG/1
500 TABLET ORAL EVERY 12 HOURS
Qty: 6 TABLET | Refills: 0 | Status: SHIPPED | OUTPATIENT
Start: 2021-10-26 | End: 2021-10-29

## 2021-10-26 RX ORDER — ALBUTEROL SULFATE 0.83 MG/ML
2.5 SOLUTION RESPIRATORY (INHALATION)
Status: DISCONTINUED | OUTPATIENT
Start: 2021-10-26 | End: 2021-10-27 | Stop reason: HOSPADM

## 2021-10-26 RX ADMIN — FENTANYL CITRATE 50 MCG: 50 INJECTION, SOLUTION INTRAMUSCULAR; INTRAVENOUS at 08:35

## 2021-10-26 RX ADMIN — FENTANYL CITRATE 50 MCG: 50 INJECTION, SOLUTION INTRAMUSCULAR; INTRAVENOUS at 08:53

## 2021-10-26 RX ADMIN — DEXAMETHASONE SODIUM PHOSPHATE 4 MG: 4 INJECTION, SOLUTION INTRAMUSCULAR; INTRAVENOUS at 08:48

## 2021-10-26 RX ADMIN — LIDOCAINE HYDROCHLORIDE 60 MG: 20 INJECTION, SOLUTION INTRAVENOUS at 08:35

## 2021-10-26 RX ADMIN — SODIUM CHLORIDE, SODIUM LACTATE, POTASSIUM CHLORIDE, AND CALCIUM CHLORIDE 125 ML/HR: 600; 310; 30; 20 INJECTION, SOLUTION INTRAVENOUS at 06:36

## 2021-10-26 RX ADMIN — CIPROFLOXACIN 400 MG: 2 INJECTION, SOLUTION INTRAVENOUS at 08:38

## 2021-10-26 RX ADMIN — ONDANSETRON HYDROCHLORIDE 4 MG: 2 INJECTION INTRAMUSCULAR; INTRAVENOUS at 08:48

## 2021-10-26 RX ADMIN — PROPOFOL 30 MG: 10 INJECTION, EMULSION INTRAVENOUS at 08:41

## 2021-10-26 RX ADMIN — MIDAZOLAM 2 MG: 1 INJECTION INTRAMUSCULAR; INTRAVENOUS at 08:29

## 2021-10-26 RX ADMIN — PROPOFOL 150 MG: 10 INJECTION, EMULSION INTRAVENOUS at 08:35

## 2021-10-26 NOTE — PERIOP NOTES
Discharge instructions given to the patient and her . opportunity for questions provided. AVS med list reviewed for duplicates.

## 2021-10-26 NOTE — OP NOTES
Name:  Todd Fierro  MR#:   [id number]  :  1950    DATE OF SERVICE:  10/26/2021     PREOPERATIVE DIAGNOSES:  Carcinoma in situ of the bladder, gross hematuria     POSTOPERATIVE DIAGNOSES:  Carcinoma in situ of the bladder, gross hematuria     PROCEDURE PERFORMED:  Cystoscopy, bladder biopsies and fulguration     SURGEON:  Bonnie Pederson MD     ASSISTANT:  None.     ANESTHESIA:  General.     ESTIMATED BLOOD LOSS:  Minimal.     COMPLICATIONS:  None.     SPECIMENS REMOVED:  Bladder biopsies of bladder erythema     IMPLANTS:  None.     DRAINS:  None.     FINDINGS:  The patient had a bladder with some patchy erythema at posterior wall and dome.       CLINICAL NOTE:  The patient is a 28-year-old female with a history of bladder cancer who had recent recurrent CIS.    PROCEDURE:  Preoperatively, risks and benefits of the surgery were described to the patient. The risks included, but were not limited to bleeding, infection, injury to the bladder, injury to the ureter, injury to the kidney, and possible need for future procedures. The patient understood the risks and signed the informed consent. The patient was taken to the operating room and placed on the OR table in the supine position. She was administered general anesthetic. She was administered intravenous antibiotics. She was placed in dorsal lithotomy position and prepped and draped in the usual sterile manner. A 22-Kinyarwanda cystoscope and sheath were then inserted transurethrally atraumatically under direct vision using a 30-degree lens. Panendoscopy was done. Bilateral ureteral orifices were in normal anatomic position. There were no papillary tumors or stones. However, there waspatchy erythema at posterior wall and dome.      Next, there were some areas of small bleeding in the bladder and these small areas were fulgurated with Bugbee electrocautery. I then did biopsies of areas of erythema on the  posterior bladder wall and dome.   These were done with cup biopsy forceps. I then fulgurated all bleeding with Bugbee electrocautery.       Next, the bladder was emptied. The scope was removed.   I then took the patient out of lithotomy position, she was revived from anesthesia and taken to recovery room in stable condition.  Baltazar Tobin MD

## 2021-10-26 NOTE — ANESTHESIA PREPROCEDURE EVALUATION
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

## 2021-10-26 NOTE — H&P
History and Physical    Subjective:      Main Germain is a 73 y.o. female  With a h/o T1 TCC of the bladder and then recurrent CIS of the bladder. She finished an induction course of BCG and presents for evaluation and biopsy. No gross hematuria. Feels well. No dysuria. Past Medical History:   Diagnosis Date    Cancer [facility])     bladder, chemo  instilled in bladder    High cholesterol     Hypertension     not on meds at present 23    [facility]bid obesity ([facility] [address]     Stroke [facility])     TIA, no residual effects    Uterus cancer ([facility] [address]      Past Surgical History:   Procedure Laterality Date    HX APPENDECTOMY      HX DILATION AND CURETTAGE      X 2    HX HYSTERECTOMY  10/2010    BSO    HX OTHER SURGICAL Right     eye- chalazion    HX TONSILLECTOMY      HX UROLOGICAL  2019    cysto, TURBt    HX UROLOGICAL      cysto      History reviewed. No pertinent family history. Social History     Tobacco Use    Smoking status: Never Smoker    Smokeless tobacco: Never Used   Substance Use Topics    Alcohol use: Yes     Comment: Rarely     Allergies   Allergen Reactions    Pcn [Penicillins] Rash, Itching and Swelling          Prior to Admission medications    Medication Sig Start Date End Date Taking? Authorizing Provider   simvastatin (Zocor) 40 mg tablet Take 40 mg by mouth nightly. Indications: high cholesterol   Yes Provider, Historical   lisinopriL (PRINIVIL, ZESTRIL) 10 mg tablet Take  by mouth daily. Provider, Historical   clopidogreL (Plavix) 75 mg tab Take  by mouth. Provider, Historical        Review of Systems:  A comprehensive review of systems was negative except for that written in the HPI.      Objective:        Patient Vitals for the past 8 hrs:   BP Temp Pulse Resp SpO2 Height Weight   10/26/21 0605 (!) 153/62 97 °F (36.1 °C) 70 18 98 % 5' 4\" (1.626 m) 127.2 kg (280 lb 8 oz)       Temp (24hrs), Av °F (36.1 °C), Min:97 °F (36.1 °C), Max:97 °F (36.1 °C)      Physical Exam:  NAD, conversational  Irregular rate  Breathing easy  Abd - soft, nd,   Ext - + mild bilat edema      Assessment:     Recurrent CIS of bladder      Plan:     1. The risks, benefits, complications, treatment options, and expected outcomes were discussed with the patient. The patient understands the risks, any and all questions were answered to the patient's satisfaction.   2. Proceed with outpatient cystoscopy and bladder biopsy

## 2021-10-26 NOTE — PERIOP NOTES
Reviewed PTA medication list with patient/caregiver and patient/caregiver denies any additional medications. Patient admits to having a responsible adult care for them at home for at least 24 hours after surgery. Patient encouraged to use gown warming system and informed that using said warming gown to regulate body temperature prior to a procedure has been shown to help reduce the risks of blood clots and infection. Patient's pharmacy of choice verified and documented in PTA medication section. Dual skin assessment & fall risk band verification completed with Marcos Bishop RN.

## 2021-10-26 NOTE — NURSE NAVIGATOR
Telephone call from [address] Cipro on backorder. Spoke with Dr Yessica Rolon who is scrubbed in OR. May substitute Levaquin 500mg daily x 3.

## 2021-10-26 NOTE — ANESTHESIA POSTPROCEDURE EVALUATION
Post-Anesthesia Evaluation & Assessment    Visit Vitals  BP (!) 123/58   Pulse 66   Temp 36.9 °C (98.4 °F)   Resp 15   Ht 5' 4\" (1.626 m)   Wt 127.2 kg (280 lb 8 oz)   SpO2 100%   BMI 48.15 kg/m²       Nausea/Vomiting: no nausea and no vomiting    Post-operative hydration adequate. Pain score (VAS): 0    Mental status & Level of consciousness: alert and oriented x 3    Neurological status: moves all extremities, sensation grossly intact    Pulmonary status: airway patent, no supplemental oxygen required    Complications related to anesthesia: none    Patient has met all discharge requirements. Additional comments:  Procedure(s):  CYSTOSCOPY, BLADDER BIOPSY AND FULGURATION WITH C-ARM. general    <BSHSIANPOST>    INITIAL Post-op Vital signs:   Vitals Value Taken Time   /58 10/26/21 0905   Temp 36.9 °C (98.4 °F) 10/26/21 0900   Pulse 63 10/26/21 0910   Resp 12 10/26/21 0910   SpO2 100 % 10/26/21 0910   Vitals shown include unvalidated device data.

## 2021-10-26 NOTE — DISCHARGE INSTRUCTIONS
Make sure to drink plenty fluids to keep the urine clear. May have a regular diet. Call Dr Ortiz Stokes at [phone] for any postoperative concerns or problems. Cystoscopy: What to Expect at [facility]     A cystoscopy is a procedure that lets a doctor look inside of the bladder and the urethra. The urethra is the tube that carries urine from the bladder to outside the body. The doctor uses a thin, lighted tool called a cystoscope. Your bladder is filled with fluid. This stretches the bladder so that your doctor can look closely at the inside of your bladder. After the cystoscopy, your urethra may be sore at first, and it may burn when you urinate for the first few days after the procedure. You may feel the need to urinate more often, and your urine may be pink. These symptoms should get better in 1 or 2 days. You will probably be able to go back to most of your usual activities in 1 or 2 days. This care sheet gives you a general idea about how long it will take for you to recover. But each person recovers at a different pace. Follow the steps below to get better as quickly as possible. How can you care for yourself at home? Activity    · Rest when you feel tired. Getting enough sleep will help you recover.     · Try to walk each day. Start by walking a little more than you did the day before. Bit by bit, increase the amount you walk. Walking boosts blood flow and helps prevent pneumonia and constipation.     · Avoid strenuous activities, such as bicycle riding, jogging, weight lifting, or aerobic exercise, until your doctor says it is okay.     · Ask your doctor when you can drive again.     · Most people are able to return to work within 1 or 2 days after the procedure.     · You may shower and take baths as usual.     · Ask your doctor when it is okay for you to have sex. Diet    · You can eat your normal diet.  If your stomach is upset, try bland, low-fat foods like plain rice, broiled chicken, toast, and yogurt.     · Drink plenty of fluids (unless your doctor tells you not to). Medicines    · Take pain medicines exactly as directed. ? If the doctor gave you a prescription medicine for pain, take it as prescribed. ? If you are not taking a prescription pain medicine, ask your doctor if you can take an over-the-counter medicine.     · If you think your pain medicine is making you sick to your stomach:  ? Take your medicine after meals (unless your doctor has told you not to). ? Ask your doctor for a different pain medicine.     · If your doctor prescribed antibiotics, take them as directed. Do not stop taking them just because you feel better. You need to take the full course of antibiotics. Follow-up care is a key part of your treatment and safety. Be sure to make and go to all appointments, and call your doctor if you are having problems. It's also a good idea to know your test results and keep a list of the medicines you take. When should you call for help? Call 911 anytime you think you may need emergency care. For example, call if:    · You passed out (lost consciousness).     · You have severe trouble breathing.     · You have sudden chest pain and shortness of breath, or you cough up blood.     · You have severe belly pain. Call your doctor now or seek immediate medical care if:    · You are sick to your stomach or cannot keep fluids down.     · Your urine is still red or you see blood clots after you have urinated several times.     · You have trouble passing urine or stool, especially if you have pain or swelling in your lower belly.     · You have signs of a blood clot, such as:  ? Pain in your calf, back of the knee, thigh, or groin. ? Redness and swelling in your leg or groin.     · You develop a fever or severe chills.     · You have pain in your back just below your rib cage. This is called flank pain.    Watch closely for changes in your health, and be sure to contact your doctor if:    · You have pain or burning when you urinate. A burning feeling is normal for a day or two after the test, but call if it does not get better.     · You have a frequent urge to urinate but can pass only small amounts of urine.     · Your urine is pink, red, or cloudy, or smells bad. It is normal for the urine to have a pinkish color for a few days after the test, but call if it does not get better. Where can you learn more? Go to http://www.gray.com/  Enter C842 in the search box to learn more about \"Cystoscopy: What to Expect at Home. \"  Current as of: February 10, 2021               Content Version: 13.0  © 2006-2021 [facility]. Care instructions adapted under license by [employer] (which disclaims liability or warranty for this information). If you have questions about a medical condition or this instruction, always ask your healthcare professional. [place] [address] any warranty or liability for your use of this information. DISCHARGE SUMMARY from Nurse    PATIENT INSTRUCTIONS:    After general anesthesia or intravenous sedation, for 24 hours or while taking prescription Narcotics:  · Limit your activities  · Do not drive and operate hazardous machinery  · Do not make important personal or business decisions  · Do  not drink alcoholic beverages  · If you have not urinated within 8 hours after discharge, please contact your surgeon on call.     Report the following to your surgeon:  · Excessive pain, swelling, redness or odor of or around the surgical area  · Temperature over 100.5  · Nausea and vomiting lasting longer than 4 hours or if unable to take medications  · Any signs of decreased circulation or nerve impairment to extremity: change in color, persistent  numbness, tingling, coldness or increase pain  · Any questions    What to do at Home:  Recommended activity: Activity as tolerated and no driving for today,     If you experience any of the following symptoms as per above, please follow up with Dr Mitch Lovell at [phone]. *  Please give a list of your current medications to your Primary Care Provider. *  Please update this list whenever your medications are discontinued, doses are      changed, or new medications (including over-the-counter products) are added. *  Please carry medication information at all times in case of emergency situations. These are general instructions for a healthy lifestyle:    No smoking/ No tobacco products/ Avoid exposure to second hand smoke  Surgeon General's Warning:  Quitting smoking now greatly reduces serious risk to your health. Obesity, smoking, and sedentary lifestyle greatly increases your risk for illness    A healthy diet, regular physical exercise & weight monitoring are important for maintaining a healthy lifestyle    You may be retaining fluid if you have a history of heart failure or if you experience any of the following symptoms:  Weight gain of 3 pounds or more overnight or 5 pounds in a week, increased swelling in our hands or feet or shortness of breath while lying flat in bed. Please call your doctor as soon as you notice any of these symptoms; do not wait until your next office visit. 10 Things to Do When You Have COVID-19    Stay home. Don't go to school, work, or public areas. And don't use public transportation, ride-shares, or taxis unless you have no choice. Leave your home only if you need to get medical care. But call the doctor's office first so they know you're coming. And wear a mask. Ask before leaving isolation. Follow your doctor's advice about when it is safe for you to leave isolation. Wear a mask when you are around other people. It can help stop the spread of the virus. Limit contact with people in your home. If possible, stay in a separate bedroom and use a separate bathroom. Avoid contact with pets and other animals.   If possible, have a friend or family member care for them while you're sick. Cover your mouth and nose with a tissue when you cough or sneeze. Then throw the tissue in the trash right away. Wash your hands often, especially after you cough or sneeze. Use soap and water, and scrub for at least 20 seconds. If soap and water aren't available, use an alcohol-based hand . Don't share personal household items. These include bedding, towels, cups and glasses, and eating utensils. Clean and disinfect your home every day. Use household  or disinfectant wipes or sprays. If needed, take acetaminophen (Tylenol) or ibuprofen (Advil, Motrin) to relieve fever and body aches. Read and follow all instructions on the label. Current as of: March 26, 2021               Content Version: 13.0  © 5378-2142 [employer]. Care instructions adapted under license by [employer] (which disclaims liability or warranty for this information). If you have questions about a medical condition or this instruction, always ask your healthcare professional. [place] [address] any warranty or liability for your use of this information. Patient armband removed and shredded    The discharge information has been reviewed with the patient and caregiver. The patient and caregiver verbalized understanding. Discharge medications reviewed with the patient and caregiver and appropriate educational materials and side effects teaching were provided.   ___________________________________________________________________________________________________________________________________

## 2022-02-15 ENCOUNTER — TRANSCRIBE ORDER (OUTPATIENT)
Dept: REGISTRATION | Age: 72
End: 2022-02-15

## 2022-02-15 ENCOUNTER — HOSPITAL ENCOUNTER (OUTPATIENT)
Dept: PREADMISSION TESTING | Age: 72
Discharge: HOME | End: 2022-02-15
Payer: MEDICARE

## 2022-02-15 DIAGNOSIS — D09.0 CARCINOMA IN SITU OF BLADDER: Primary | ICD-10-CM

## 2022-02-15 DIAGNOSIS — D09.0 CARCINOMA IN SITU OF BLADDER: ICD-10-CM

## 2022-02-15 LAB
ANION GAP SERPL CALC-SCNC: 3 MMOL/L (ref 3–18)
BUN SERPL-MCNC: 25 MG/DL (ref 7–18)
BUN/CREAT SERPL: 23 (ref 12–20)
CALCIUM SERPL-MCNC: 8.4 MG/DL (ref 8.5–10.1)
CHLORIDE SERPL-SCNC: 111 MMOL/L (ref 100–111)
CO2 SERPL-SCNC: 29 MMOL/L (ref 21–32)
CREAT SERPL-MCNC: 1.1 MG/DL (ref 0.6–1.3)
ERYTHROCYTE [DISTWIDTH] IN BLOOD BY AUTOMATED COUNT: 12.1 % (ref 11.6–14.5)
GLUCOSE SERPL-MCNC: 118 MG/DL (ref 74–99)
HCT VFR BLD AUTO: 44.1 % (ref 35–45)
HGB BLD-MCNC: 13.9 G/DL (ref 12–16)
MCH RBC QN AUTO: 30.6 PG (ref 24–34)
MCHC RBC AUTO-ENTMCNC: 31.5 G/DL (ref 31–37)
MCV RBC AUTO: 97.1 FL (ref 78–100)
NRBC # BLD: 0 K/UL (ref 0–0.01)
NRBC BLD-RTO: 0 PER 100 WBC
PLATELET # BLD AUTO: 230 K/UL (ref 135–420)
PMV BLD AUTO: 10.5 FL (ref 9.2–11.8)
POTASSIUM SERPL-SCNC: 4.8 MMOL/L (ref 3.5–5.5)
RBC # BLD AUTO: 4.54 M/UL (ref 4.2–5.3)
SODIUM SERPL-SCNC: 143 MMOL/L (ref 136–145)
WBC # BLD AUTO: 8 K/UL (ref 4.6–13.2)

## 2022-02-15 PROCEDURE — 80048 BASIC METABOLIC PNL TOTAL CA: CPT

## 2022-02-15 PROCEDURE — 85027 COMPLETE CBC AUTOMATED: CPT

## 2022-02-15 PROCEDURE — 93005 ELECTROCARDIOGRAM TRACING: CPT

## 2022-02-15 PROCEDURE — 36415 COLL VENOUS BLD VENIPUNCTURE: CPT

## 2022-02-16 LAB
ATRIAL RATE: 84 BPM
CALCULATED P AXIS, ECG09: 72 DEGREES
CALCULATED R AXIS, ECG10: -18 DEGREES
CALCULATED T AXIS, ECG11: 73 DEGREES
DIAGNOSIS, 93000: NORMAL
P-R INTERVAL, ECG05: 150 MS
Q-T INTERVAL, ECG07: 374 MS
QRS DURATION, ECG06: 94 MS
QTC CALCULATION (BEZET), ECG08: 441 MS
VENTRICULAR RATE, ECG03: 84 BPM

## 2022-03-03 ENCOUNTER — HOSPITAL ENCOUNTER (OUTPATIENT)
Dept: PREADMISSION TESTING | Age: 72
Discharge: HOME | End: 2022-03-03

## 2022-03-03 VITALS — BODY MASS INDEX: 48.32 KG/M2 | HEIGHT: 65 IN | WEIGHT: 290 LBS

## 2022-03-03 RX ORDER — SODIUM CHLORIDE, SODIUM LACTATE, POTASSIUM CHLORIDE, CALCIUM CHLORIDE 600; 310; 30; 20 MG/100ML; MG/100ML; MG/100ML; MG/100ML
125 INJECTION, SOLUTION INTRAVENOUS CONTINUOUS
Status: CANCELLED | OUTPATIENT
Start: 2022-03-03

## 2022-03-03 RX ORDER — LEVOFLOXACIN 5 MG/ML
500 INJECTION, SOLUTION INTRAVENOUS ONCE
Status: CANCELLED | OUTPATIENT
Start: 2022-03-03 | End: 2022-03-03

## 2022-03-03 NOTE — PERIOP NOTES
PAT - SURGICAL PRE-ADMISSION INSTRUCTIONS    NAME:  Tracy Coker                                                          TODAY'S DATE:  3/3/2022    SURGERY DATE:  3/8/2022                                  SURGERY ARRIVAL TIME:   TBA    1. Do NOT eat or drink anything, including candy or gum, after MIDNIGHT on 3/3/2022 , unless you have specific instructions from your Surgeon or Anesthesia Provider to do so. 2. No smoking 24 hours before surgery. 3. No alcohol 24 hours prior to the day of surgery. 4. No recreational drugs for one week prior to the day of surgery. 5. Leave all valuables, including money/purse, at home. 6. Remove all jewelry, nail polish, makeup (including mascara); no lotions, powders, deodorant, or perfume/cologne/after shave. 7. Glasses/Contact lenses and Dentures may be worn to the hospital.  They will be removed prior to surgery. 8. Call your doctor if symptoms of a cold or illness develop within 24 ours prior to surgery. 9. AN ADULT MUST DRIVE YOU HOME AFTER OUTPATIENT SURGERY. 10. If you are having an OUTPATIENT procedure, please make arrangements for a responsible adult to be with you for 24 hours after your surgery. 11. If you are admitted to the hospital, you will be assigned to a bed after surgery is complete. Normally a family member will not be able to see you until you are in your assigned bed. 12. Visitation Restrictions Explained. Special Instructions:  Covid Test not needed as patient is vaccinated and card on media, Quarantine requirements discussed. No Advanced Directive or DNR  Take these medications the morning of surgery with a sip of water:  lisinipril    Patient Prep:    use skin prep cloths with CHG     These surgical instructions were reviewed with patient during the PAT phone call (visit/phone call).

## 2022-03-08 ENCOUNTER — ANESTHESIA (OUTPATIENT)
Dept: SURGERY | Age: 72
End: 2022-03-08
Payer: MEDICARE

## 2022-03-08 ENCOUNTER — HOSPITAL ENCOUNTER (OUTPATIENT)
Age: 72
Setting detail: OUTPATIENT SURGERY
Discharge: HOME | End: 2022-03-08
Attending: UROLOGY | Admitting: UROLOGY
Payer: MEDICARE

## 2022-03-08 ENCOUNTER — ANESTHESIA EVENT (OUTPATIENT)
Dept: SURGERY | Age: 72
End: 2022-03-08
Payer: MEDICARE

## 2022-03-08 VITALS
SYSTOLIC BLOOD PRESSURE: 144 MMHG | WEIGHT: 293 LBS | HEIGHT: 64 IN | OXYGEN SATURATION: 99 % | TEMPERATURE: 96.5 F | HEART RATE: 60 BPM | BODY MASS INDEX: 50.02 KG/M2 | DIASTOLIC BLOOD PRESSURE: 62 MMHG | RESPIRATION RATE: 16 BRPM

## 2022-03-08 PROBLEM — D09.0 CIS (CARCINOMA IN SITU OF BLADDER): Status: ACTIVE | Noted: 2022-03-08

## 2022-03-08 PROCEDURE — 76010000154 HC OR TIME FIRST 0.5 HR: Performed by: UROLOGY

## 2022-03-08 PROCEDURE — 74011250636 HC RX REV CODE- 250/636: Performed by: UROLOGY

## 2022-03-08 PROCEDURE — 77030018831 HC SOL IRR H20 BAXT -A: Performed by: UROLOGY

## 2022-03-08 PROCEDURE — 76210000063 HC OR PH I REC FIRST 0.5 HR: Performed by: UROLOGY

## 2022-03-08 PROCEDURE — 77030040361 HC SLV COMPR DVT MDII -B: Performed by: UROLOGY

## 2022-03-08 PROCEDURE — 76210000021 HC REC RM PH II 0.5 TO 1 HR: Performed by: UROLOGY

## 2022-03-08 PROCEDURE — 74011000250 HC RX REV CODE- 250: Performed by: NURSE ANESTHETIST, CERTIFIED REGISTERED

## 2022-03-08 PROCEDURE — 88305 TISSUE EXAM BY PATHOLOGIST: CPT

## 2022-03-08 PROCEDURE — 77030020782 HC GWN BAIR PAWS FLX 3M -B: Performed by: UROLOGY

## 2022-03-08 PROCEDURE — 74011250636 HC RX REV CODE- 250/636: Performed by: NURSE ANESTHETIST, CERTIFIED REGISTERED

## 2022-03-08 PROCEDURE — 77030012508 HC MSK AIRWY LMA AMBU -A: Performed by: ANESTHESIOLOGY

## 2022-03-08 PROCEDURE — 2709999900 HC NON-CHARGEABLE SUPPLY: Performed by: UROLOGY

## 2022-03-08 PROCEDURE — 76060000031 HC ANESTHESIA FIRST 0.5 HR: Performed by: UROLOGY

## 2022-03-08 RX ORDER — ONDANSETRON 2 MG/ML
4 INJECTION INTRAMUSCULAR; INTRAVENOUS ONCE
Status: DISCONTINUED | OUTPATIENT
Start: 2022-03-08 | End: 2022-03-08 | Stop reason: HOSPADM

## 2022-03-08 RX ORDER — SODIUM CHLORIDE, SODIUM LACTATE, POTASSIUM CHLORIDE, CALCIUM CHLORIDE 600; 310; 30; 20 MG/100ML; MG/100ML; MG/100ML; MG/100ML
125 INJECTION, SOLUTION INTRAVENOUS CONTINUOUS
Status: DISCONTINUED | OUTPATIENT
Start: 2022-03-08 | End: 2022-03-08 | Stop reason: HOSPADM

## 2022-03-08 RX ORDER — LEVOFLOXACIN 500 MG/1
500 TABLET, FILM COATED ORAL DAILY
Qty: 3 TABLET | Refills: 0 | Status: SHIPPED | OUTPATIENT
Start: 2022-03-09 | End: 2022-03-12

## 2022-03-08 RX ORDER — PROPOFOL 10 MG/ML
INJECTION, EMULSION INTRAVENOUS AS NEEDED
Status: DISCONTINUED | OUTPATIENT
Start: 2022-03-08 | End: 2022-03-08 | Stop reason: HOSPADM

## 2022-03-08 RX ORDER — DEXAMETHASONE SODIUM PHOSPHATE 4 MG/ML
INJECTION, SOLUTION INTRA-ARTICULAR; INTRALESIONAL; INTRAMUSCULAR; INTRAVENOUS; SOFT TISSUE AS NEEDED
Status: DISCONTINUED | OUTPATIENT
Start: 2022-03-08 | End: 2022-03-08 | Stop reason: HOSPADM

## 2022-03-08 RX ORDER — HYDROMORPHONE HYDROCHLORIDE 1 MG/ML
0.5 INJECTION, SOLUTION INTRAMUSCULAR; INTRAVENOUS; SUBCUTANEOUS
Status: DISCONTINUED | OUTPATIENT
Start: 2022-03-08 | End: 2022-03-08 | Stop reason: HOSPADM

## 2022-03-08 RX ORDER — LEVOFLOXACIN 5 MG/ML
500 INJECTION, SOLUTION INTRAVENOUS ONCE
Status: COMPLETED | OUTPATIENT
Start: 2022-03-08 | End: 2022-03-08

## 2022-03-08 RX ORDER — MAGNESIUM SULFATE 100 %
4 CRYSTALS MISCELLANEOUS AS NEEDED
Status: DISCONTINUED | OUTPATIENT
Start: 2022-03-08 | End: 2022-03-08 | Stop reason: HOSPADM

## 2022-03-08 RX ORDER — NALOXONE HYDROCHLORIDE 0.4 MG/ML
0.1 INJECTION, SOLUTION INTRAMUSCULAR; INTRAVENOUS; SUBCUTANEOUS
Status: DISCONTINUED | OUTPATIENT
Start: 2022-03-08 | End: 2022-03-08 | Stop reason: HOSPADM

## 2022-03-08 RX ORDER — INSULIN LISPRO 100 [IU]/ML
INJECTION, SOLUTION INTRAVENOUS; SUBCUTANEOUS ONCE
Status: DISCONTINUED | OUTPATIENT
Start: 2022-03-08 | End: 2022-03-08 | Stop reason: HOSPADM

## 2022-03-08 RX ORDER — SODIUM CHLORIDE, SODIUM LACTATE, POTASSIUM CHLORIDE, CALCIUM CHLORIDE 600; 310; 30; 20 MG/100ML; MG/100ML; MG/100ML; MG/100ML
50 INJECTION, SOLUTION INTRAVENOUS CONTINUOUS
Status: DISCONTINUED | OUTPATIENT
Start: 2022-03-08 | End: 2022-03-08 | Stop reason: HOSPADM

## 2022-03-08 RX ORDER — FENTANYL CITRATE 50 UG/ML
25 INJECTION, SOLUTION INTRAMUSCULAR; INTRAVENOUS
Status: DISCONTINUED | OUTPATIENT
Start: 2022-03-08 | End: 2022-03-08 | Stop reason: HOSPADM

## 2022-03-08 RX ORDER — ONDANSETRON 2 MG/ML
INJECTION INTRAMUSCULAR; INTRAVENOUS AS NEEDED
Status: DISCONTINUED | OUTPATIENT
Start: 2022-03-08 | End: 2022-03-08 | Stop reason: HOSPADM

## 2022-03-08 RX ORDER — MIDAZOLAM HYDROCHLORIDE 1 MG/ML
INJECTION, SOLUTION INTRAMUSCULAR; INTRAVENOUS AS NEEDED
Status: DISCONTINUED | OUTPATIENT
Start: 2022-03-08 | End: 2022-03-08 | Stop reason: HOSPADM

## 2022-03-08 RX ORDER — LIDOCAINE HYDROCHLORIDE 20 MG/ML
INJECTION, SOLUTION EPIDURAL; INFILTRATION; INTRACAUDAL; PERINEURAL AS NEEDED
Status: DISCONTINUED | OUTPATIENT
Start: 2022-03-08 | End: 2022-03-08 | Stop reason: HOSPADM

## 2022-03-08 RX ORDER — FENTANYL CITRATE 50 UG/ML
INJECTION, SOLUTION INTRAMUSCULAR; INTRAVENOUS AS NEEDED
Status: DISCONTINUED | OUTPATIENT
Start: 2022-03-08 | End: 2022-03-08 | Stop reason: HOSPADM

## 2022-03-08 RX ORDER — OXYCODONE AND ACETAMINOPHEN 5; 325 MG/1; MG/1
1 TABLET ORAL AS NEEDED
Status: DISCONTINUED | OUTPATIENT
Start: 2022-03-08 | End: 2022-03-08 | Stop reason: HOSPADM

## 2022-03-08 RX ADMIN — SODIUM CHLORIDE, SODIUM LACTATE, POTASSIUM CHLORIDE, AND CALCIUM CHLORIDE 125 ML/HR: 600; 310; 30; 20 INJECTION, SOLUTION INTRAVENOUS at 08:47

## 2022-03-08 RX ADMIN — LEVOFLOXACIN 500 MG: 5 INJECTION, SOLUTION INTRAVENOUS at 08:16

## 2022-03-08 RX ADMIN — PROPOFOL 180 MG: 10 INJECTION, EMULSION INTRAVENOUS at 08:23

## 2022-03-08 RX ADMIN — DEXAMETHASONE SODIUM PHOSPHATE 4 MG: 4 INJECTION, SOLUTION INTRAMUSCULAR; INTRAVENOUS at 08:29

## 2022-03-08 RX ADMIN — ONDANSETRON HYDROCHLORIDE 4 MG: 2 INJECTION INTRAMUSCULAR; INTRAVENOUS at 08:29

## 2022-03-08 RX ADMIN — FENTANYL CITRATE 50 MCG: 50 INJECTION, SOLUTION INTRAMUSCULAR; INTRAVENOUS at 08:29

## 2022-03-08 RX ADMIN — LEVOFLOXACIN 900 MG: 5 INJECTION, SOLUTION INTRAVENOUS at 08:42

## 2022-03-08 RX ADMIN — FENTANYL CITRATE 50 MCG: 50 INJECTION, SOLUTION INTRAMUSCULAR; INTRAVENOUS at 08:16

## 2022-03-08 RX ADMIN — MIDAZOLAM 2 MG: 1 INJECTION INTRAMUSCULAR; INTRAVENOUS at 08:16

## 2022-03-08 RX ADMIN — SODIUM CHLORIDE, SODIUM LACTATE, POTASSIUM CHLORIDE, AND CALCIUM CHLORIDE 125 ML/HR: 600; 310; 30; 20 INJECTION, SOLUTION INTRAVENOUS at 07:10

## 2022-03-08 RX ADMIN — LIDOCAINE HYDROCHLORIDE 60 MG: 20 INJECTION, SOLUTION INTRAVENOUS at 08:23

## 2022-03-08 NOTE — DISCHARGE INSTRUCTIONS
DISCHARGE SUMMARY from Nurse    PATIENT INSTRUCTIONS:    After general anesthesia or intravenous sedation, for 24 hours or while taking prescription Narcotics:  · Limit your activities  · Do not drive and operate hazardous machinery  · Do not make important personal or business decisions  · Do  not drink alcoholic beverages  · If you have not urinated within 8 hours after discharge, please contact your surgeon on call. Report the following to your surgeon:  · Excessive pain, swelling, redness or odor of or around the surgical area  · Temperature over 100.5  · Nausea and vomiting lasting longer than 4 hours or if unable to take medications  · Any signs of decreased circulation or nerve impairment to extremity: change in color, persistent  numbness, tingling, coldness or increase pain  · Any questions    What to do at Home:  [address] A POST OP CHECK UP    If you experience any of the following symptoms heavy bleeding, unable to void, fevers, severe pain, please follow up with dr Katarina Heard    *  Please give a list of your current medications to your Primary Care Provider. *  Please update this list whenever your medications are discontinued, doses are      changed, or new medications (including over-the-counter products) are added. *  Please carry medication information at all times in case of emergency situations. These are general instructions for a healthy lifestyle:    No smoking/ No tobacco products/ Avoid exposure to second hand smoke  Surgeon General's Warning:  Quitting smoking now greatly reduces serious risk to your health.     Obesity, smoking, and sedentary lifestyle greatly increases your risk for illness    A healthy diet, regular physical exercise & weight monitoring are important for maintaining a healthy lifestyle    You may be retaining fluid if you have a history of heart failure or if you experience any of the following symptoms:  Weight gain of 3 pounds or more overnight or 5 pounds in a week, increased swelling in our hands or feet or shortness of breath while lying flat in bed. Please call your doctor as soon as you notice any of these symptoms; do not wait until your next office visit. The discharge information has been reviewed with the patient and caregiver. The patient and caregiver verbalized understanding. Discharge medications reviewed with the patient and caregiver and appropriate educational materials and side effects teaching were provided.   ___________________________________________________________________________________________________________________________________    Patient armband removed and shredded

## 2022-03-08 NOTE — H&P
History and Physical    Subjective:      Kathy Ron is a 73 y.o. 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 She has been on monthly maintenance BCG and is doing well. No gross hematuria except for around BCG treatmnets. No dysuria. Past Medical History:   Diagnosis Date    Cancer [facility])     bladder, chemo  instilled in bladder    High cholesterol     Hypertension 2009    not on meds at present 23    [facility] [address]     Stroke [facility])     TIA, no residual effects    Uterus cancer ([facility] [address]      Past Surgical History:   Procedure Laterality Date    HX APPENDECTOMY      HX DILATION AND CURETTAGE      X 2    HX HYSTERECTOMY  10/2010    BSO    HX OTHER SURGICAL Right     eye- chalazion    HX TONSILLECTOMY      HX UROLOGICAL  2019    cysto, TURBt    HX UROLOGICAL      cysto      History reviewed. No pertinent family history. Social History     Tobacco Use    Smoking status: Never Smoker    Smokeless tobacco: Never Used   Substance Use Topics    Alcohol use: Yes     Comment: Rarely     Allergies   Allergen Reactions    Pcn [Penicillins] Rash, Itching and Swelling          Prior to Admission medications    Medication Sig Start Date End Date Taking? Authorizing Provider   lisinopriL (PRINIVIL, ZESTRIL) 10 mg tablet Take  by mouth daily. Yes Provider, Historical   clopidogreL (Plavix) 75 mg tab Take  by mouth. Yes Provider, Historical   simvastatin (Zocor) 40 mg tablet Take 40 mg by mouth nightly. Indications: high cholesterol   Yes Provider, Historical        Review of Systems:  A comprehensive review of systems was negative except for that written in the HPI.      Objective:        Patient Vitals for the past 8 hrs:   BP Temp Pulse Resp SpO2 Height Weight   22 0621 (!) 138/46 97.8 °F (36.6 °C) 75 18 100 % 5' 4\" (1.626 m) 137.9 kg (304 lb 1.6 oz)       Temp (24hrs), Av.8 °F (36.6 °C), Min:97.8 °F (36.6 °C), Max:97.8 °F (36.6 °C)      Physical Exam:  NAD  RRR  Breathing easy  Abd - soft, nd, obese, nt  Ext - no edema      Assessment:     CIS of bladder      Plan:     1. The risks, benefits, complications, treatment options, and expected outcomes were discussed with the patient. The patient understands the risks, any and all questions were answered to the patient's satisfaction.   2. Proceed with outpatient cystoscopy / bladder biopsy and fulguration

## 2022-03-08 NOTE — OP NOTES
[facility]  OPERATIVE REPORT    Name:  Dennise Gusman  MR#:   [id number]  :  1950  ACCOUNT #:  [de-identified]  DATE OF SERVICE:  2022    PREOPERATIVE DIAGNOSIS:  Carcinoma in situ of the bladder. POSTOPERATIVE DIAGNOSIS:  Carcinoma in situ of the bladder. PROCEDURES PERFORMED:  Cystoscopy, bladder biopsies, and fulguration. SURGEON:  Mariposa Damon MD    ASSISTANT:  None. ANESTHESIA:  General.    COMPLICATIONS:  None. SPECIMENS REMOVED:  Bladder erythema. IMPLANTS:  None. DRAINS:  None. ESTIMATED BLOOD LOSS:  Minimal.    DISPOSITION:  The patient was taken to Recovery in stable condition. FINDINGS:  The patient had no papillary tumors on cystoscopy. Bilateral ureteral orifices were normal.  The bladder neck which previously has had tumor was normal.  There was some persistent raised erythema on the right lateral wall near previous resection site. This area was biopsied and fulgurated. CLINICAL NOTE:  The patient is a 82-year-old female with a history of T1 TCC of the bladder as well as carcinoma in situ of the bladder. She has had BCG treatments and has been feeling fine. She presents for routine cystoscopy and biopsy. PROCEDURE:  Preoperatively, the risks and benefits of the surgery were described to the patient. The risks included, but are not limited to, bleeding, infection, injury to the bladder, injury to the ureters, and possible need for future procedures. The patient understood the risks and signed the informed consent. The patient was taken to the operating room and placed on the OR table in supine position. She was administered general anesthetic. She was administered intravenous antibiotics. She was placed in dorsal lithotomy position and prepped and draped in the usual sterile manner. A 22-Slovenian cystoscope and sheath were then inserted transurethrally and atraumatically under direct vision using a 30-degree lens. Panendoscopy was done. The bilateral ureteral orifices were in normal anatomic position. There were no stones or papillary tumors anywhere within the bladder. However, there was some persistent raised erythema on the right lateral wall near her previous resection site. This area was biopsied using cup forceps and all bleeding that ensued was controlled with fulguration using Bugbee electrocautery. The area was inspected, and at the end, there was no active bleeding. The UOs were unharmed, and at this point, the bladder was emptied and the patient was taken out of lithotomy position and revived from anesthesia and taken to Recovery in stable condition.       MD ARNOLDO [email or web address]  D:  03/08/2022 8:51  T:  03/08/2022 10:12  JOB #:  [id number]

## 2022-03-08 NOTE — BRIEF OP NOTE
Brief Postoperative Note    Patient: Zuleika Guan  YOB: 1950  MRN: [id number]    Date of Procedure: 3/8/2022     Pre-Op Diagnosis: CARCINOMA INSITU OF BLADDER    Post-Op Diagnosis: Same as preoperative diagnosis. Procedure(s):  CYSTOSCOPY BLADDER BIOPSIES AND FULGURATION OF BLEEDERS WITH C-ARM    Surgeon(s):  Nixon Ramírez MD    Surgical Assistant: None    Anesthesia: Other     Estimated Blood Loss (mL): Minimal    Complications: None    Specimens:   ID Type Source Tests Collected by Time Destination   1 : bladder erythema  Preservative Bladder  Nixon Ramírez MD 3/8/2022 5118 Pathology        Implants: * No implants in log *    Drains: * No LDAs found *    Findings: Bilat UO's and bladder neck normal.  No papillary tumors. Mild erythema on right lateral wall near prior resection sight.   Erythema was biopsied and fulgurated    Electronically Signed by Raymon Schmidt MD on 3/8/2022 at 8:46 AM

## 2022-03-08 NOTE — PERIOP NOTES
Reviewed PTA medication list with patient/caregiver and patient/caregiver denies any additional medications. Patient admits to having a responsible adult care for them at home for at least 24 hours after surgery. Patient encouraged to use gown warming system and informed that using said warming gown to regulate body temperature prior to a procedure has been shown to help reduce the risks of blood clots and infection. Patient's pharmacy of choice verified and documented in PTA medication section. Dual skin assessment & fall risk band verification completed with Jennifer GEIGER.

## 2022-03-08 NOTE — ANESTHESIA POSTPROCEDURE EVALUATION
Post-Anesthesia Evaluation and Assessment    Cardiovascular Function/Vital Signs  Visit Vitals  BP (!) 112/49   Pulse 66   Temp 36.2 °C (97.1 °F)   Resp 16   Ht 5' 4\" (1.626 m)   Wt 137.9 kg (304 lb 1.6 oz)   SpO2 97%   BMI 52.20 kg/m²       Patient is status post Procedure(s):  CYSTOSCOPY BLADDER BIOPSIES AND FULGURATION OF BLEEDERS WITH C-ARM. Nausea/Vomiting: Controlled. Postoperative hydration reviewed and adequate. Pain:  Pain Scale 1: Numeric (0 - 10) (03/08/22 0900)  Pain Intensity 1: 0 (03/08/22 0900)   Managed. Neurological Status:   Neuro (WDL): Within Defined Limits (03/08/22 0900)   At baseline. Mental Status and Level of Consciousness: Baseline and stable. Pulmonary Status:   O2 Device: None (Room air) (03/08/22 0858)   Adequate oxygenation and airway patent. Complications related to anesthesia: None    Post-anesthesia assessment completed. No concerns. Patient has met all discharge requirements.     Signed By: Matilda Gunn MD

## 2022-03-08 NOTE — ANESTHESIA PREPROCEDURE EVALUATION
Relevant Problems   RENAL FAILURE   (+) Neoplasm of left kidney      PERSONAL HX & FAMILY HX OF CANCER   (+) Cancer of bladder neck (HCC)   (+) Endometrial cancer (HCC)       Anesthetic History   No history of anesthetic complications            Review of Systems / Medical History  Patient summary reviewed, nursing notes reviewed and pertinent labs reviewed    Pulmonary  Within defined limits                 Neuro/Psych       CVA  TIA     Cardiovascular    Hypertension                   GI/Hepatic/Renal  Within defined limits              Endo/Other        Morbid obesity     Other Findings              Physical Exam    Airway  Mallampati: II  TM Distance: 4 - 6 cm  Neck ROM: normal range of motion   Mouth opening: Normal     Cardiovascular  Regular rate and rhythm,  S1 and S2 normal,  no murmur, click, rub, or gallop             Dental  No notable dental hx       Pulmonary  Breath sounds clear to auscultation               Abdominal  GI exam deferred       Other Findings            Anesthetic Plan    ASA: 3  Anesthesia type: general          Induction: Intravenous  Anesthetic plan and risks discussed with: Patient

## 2023-04-21 NOTE — BRIEF OP NOTE
BRIEF OPERATIVE NOTE    Date of Procedure: 12/31/2019   Preoperative Diagnosis: NEOPLASM OF  BLADDER UNSPECIFIED  Postoperative Diagnosis: NEOPLASM OF  BLADDER  UNSPECIFIED  Procedure(s):  CYSTOSCOPY, TRANSURETHRAL RESECTION OF BLADDER TUMOR (LARGE) WITH INTRAVESICAL MITOMYCIN  Surgeon(s) and Role:     * Truong Fox MD - Primary         Surgical Assistant: NONE    Surgical Staff:  Circ-1: Sadiq Grover  Scrub Tech-1: Shaun Gooden  Scrub Tech-2: Teresita De La Cruz  Event Time In Time Out   Incision Start 0750    Incision Close 0849      Anesthesia: General   Estimated Blood Loss: MINIMAL  Specimens:   ID Type Source Tests Collected by Time Destination   1 : BLADDER TUMOR Preservative Bladder  Truong Fox MD 12/31/2019 0882 Pathology   2 : BLADDER TUMOR- DEEP  Preservative Bladder  Truong Fox MD 12/31/2019 9701 Pathology      Findings: lARGE >7CM MASS AT THE BLADDER NECK VERY OBSTRUCTIVE.   COMING FROM RIGHT SIDE OF BLADDER NECK LATERALLY  Complications: NONE  Implants: 20 FR AQUINO FAMILY HISTORY:  No pertinent family history in first degree relatives

## (undated) DEVICE — SKIN MARKER,REGULAR TIP WITH RULER AND LABELS: Brand: DEVON

## (undated) DEVICE — SOLUTION IRRIG 1500ML STRL H2O USP POUR PLAS BTL

## (undated) DEVICE — CATH URET 5FRX70CM W/OPN END -- BX/20

## (undated) DEVICE — TUBING, SUCTION, 1/4" X 12', STRAIGHT: Brand: MEDLINE

## (undated) DEVICE — CATHETER URETH 20FR BLLN 5CC STD LTX HYDRGEL 2 W F BARDX

## (undated) DEVICE — STRAP,POSITIONING,KNEE/BODY,FOAM,4X60": Brand: MEDLINE

## (undated) DEVICE — CYSTO PACK: Brand: MEDLINE INDUSTRIES, INC.

## (undated) DEVICE — INFLATION DEVICE: Brand: ENCORE 26

## (undated) DEVICE — GDWIRE 3CM FLX-TIP 0.038X150CM -- BX/5 SENSOR

## (undated) DEVICE — SOLUTION IRRIG 3000ML H2O STRL BAG

## (undated) DEVICE — GARMENT,MEDLINE,DVT,INT,CALF,MED, GEN2: Brand: MEDLINE

## (undated) DEVICE — TRAP MUCUS SPECIMEN 40ML -- MEDICHOICE

## (undated) DEVICE — BASIC SINGLE BASIN 1-LF: Brand: MEDLINE INDUSTRIES, INC.

## (undated) DEVICE — REM POLYHESIVE ADULT PATIENT RETURN ELECTRODE: Brand: VALLEYLAB

## (undated) DEVICE — DUAL LUMEN URETERAL CATHETER

## (undated) DEVICE — SOL IRR STRL H2O 1500ML BTL --

## (undated) DEVICE — POUCH DRNGE FLX BND INTEGR RAIL CLMP DISP EZ CTCH

## (undated) DEVICE — SOLUTION IRRIG 3000ML 0.9% SOD CHL FLX CONT 0797208] ICU MEDICAL INC]

## (undated) DEVICE — CATH URETH FOL 2W MED 20FRX5 --

## (undated) DEVICE — SOL IRR SOD CL 0.9% 3000ML BG --

## (undated) DEVICE — MARKER,SKIN,WI/RULER AND LABELS: Brand: MEDLINE

## (undated) DEVICE — CUTTING ELECTRODE BIPO 24FR 12/30°  FOR RESECTOSCOPES, TELESCOPE Ø 4MM, FOR SHEATHS, INTERMITTENT/CONTINUOUS IRRIGATION, 24/26, FR, LOOP: ROUND, WIRE Ø 0.3MM, FORK COLOR BLUE, STEM COLOR BLUE, PACK=3 PCS, FOR SHARK AND S-LINE RESECTOSCOPES, STERILE, FOR SINGLE USE: Brand: SHARK/S-LINE

## (undated) DEVICE — STERILE LATEX POWDER-FREE SURGICAL GLOVESWITH NITRILE COATING: Brand: PROTEXIS

## (undated) DEVICE — CATH URET AXXCESS 6FRX70CM -- BX/10

## (undated) DEVICE — BAG URIN LEG DISPOZ-A-BG 19OZ -- W/18IN EXT TUBING

## (undated) DEVICE — MEDI-VAC NON-CONDUCTIVE SUCTION TUBING: Brand: CARDINAL HEALTH

## (undated) DEVICE — BALLOON DILATATION CATHETER: Brand: UROMAX ULTRA

## (undated) DEVICE — DRAPE XR C ARM 41X74IN LF --

## (undated) DEVICE — PAD,NON-ADHERENT,3X8,STERILE,LF,1/PK: Brand: MEDLINE

## (undated) DEVICE — SEAL INSTR CYSTO ADJ BX PRT SEAL FOR ACC UP TO 6FR